# Patient Record
Sex: MALE | Race: WHITE | NOT HISPANIC OR LATINO | Employment: FULL TIME | URBAN - METROPOLITAN AREA
[De-identification: names, ages, dates, MRNs, and addresses within clinical notes are randomized per-mention and may not be internally consistent; named-entity substitution may affect disease eponyms.]

---

## 2020-02-11 ENCOUNTER — OFFICE VISIT CONVERTED (OUTPATIENT)
Dept: ORTHOPEDIC SURGERY | Facility: CLINIC | Age: 60
End: 2020-02-11
Attending: ORTHOPAEDIC SURGERY

## 2020-11-17 ENCOUNTER — OFFICE VISIT CONVERTED (OUTPATIENT)
Dept: ORTHOPEDIC SURGERY | Facility: CLINIC | Age: 60
End: 2020-11-17
Attending: ORTHOPAEDIC SURGERY

## 2021-01-15 ENCOUNTER — HOSPITAL ENCOUNTER (OUTPATIENT)
Dept: URGENT CARE | Facility: CLINIC | Age: 61
Discharge: HOME | End: 2021-01-15
Attending: NURSE PRACTITIONER

## 2021-02-03 ENCOUNTER — HOSPITAL ENCOUNTER (OUTPATIENT)
Dept: GENERAL RADIOLOGY | Facility: HOSPITAL | Age: 61
Discharge: HOME | End: 2021-02-03
Attending: INTERNAL MEDICINE

## 2021-02-22 ENCOUNTER — HOSPITAL ENCOUNTER (OUTPATIENT)
Dept: CARDIOLOGY | Facility: HOSPITAL | Age: 61
Discharge: HOME | End: 2021-02-22
Attending: INTERNAL MEDICINE

## 2021-04-01 ENCOUNTER — OFFICE VISIT CONVERTED (OUTPATIENT)
Dept: ORTHOPEDIC SURGERY | Facility: CLINIC | Age: 61
End: 2021-04-01

## 2021-05-13 NOTE — PROGRESS NOTES
Progress Note      Patient Name: Osmani Bucio   Patient ID: [id number]   Sex: Male   YOB: 1960    Primary Care Provider: Wild Doyle MD    Visit Date: November 17, 2020    Provider: Kaur Florence MD   Location: [facility]   Location Address: [address] [place] [address]  [id number]   Location Phone: [phone]          Chief Complaint  · Follow up bilateral knee pain      History Of Present Illness  Osmani Bucio is a 60 year old /White male who presents today to [facility].      Patient presents today with a follow-up of bilateral knee pain. Patient has a history of bilateral knee osteoarthritis. Knee pain is greater on the right than the left. Patient states history of right knee arthroscopy 5+ years ago. Patient states he does have some shooting pain in his knees when he moves them a particular way. The injection he received last visit did give him significant relief of pain.       Past Medical History  Arthritis; Diabetes; Limb Swelling; Reflux         Past Surgical History  Appendectomy; Back; Colonoscopy; Joint Surgery; Metal implants         Medication List  diclofenac sodium 75 mg oral tablet,delayed release (DR/EC)         Allergy List  Codiene       Allergies Reconciled  Family Medical History  Heart Disease; Cancer, Unspecified; Diabetes, unspecified type; Osteoporosis; Family history of Arthritis         Social History  Alcohol Use (Never); lives with spouse; .; Recreational Drug Use (Never); Tobacco (Never); Working         Review of Systems  · Constitutional  o Denies  o : fever, chills, weight loss  · Cardiovascular  o Denies  o : chest pain, shortness of breath  · Gastrointestinal  o Denies  o : liver disease, heartburn, nausea, blood in stools  · Genitourinary  o Denies  o : painful urination, blood in urine  · Integument  o Denies  o : rash, itching  · Neurologic  o Denies  o : headache, weakness, loss of  "consciousness  · Musculoskeletal  o Denies  o : painful, swollen joints  · Psychiatric  o Denies  o : drug/alcohol addiction, anxiety, depression      Vitals  Date Time BP Position Site L\R Cuff Size HR RR TEMP (F) WT  HT  BMI kg/m2 BSA m2 O2 Sat FR L/min FiO2 HC       11/17/2020 02:49 PM      72 - R   247lbs 0oz 6'  1\" 32.59 2.4 98 %            Physical Examination  · Constitutional  o Appearance  o : well developed, well-nourished, no obvious deformities present  · Head and Face  o Head  o :   § Inspection  § : normocephalic  o Face  o :   § Inspection  § : no facial lesions  · Eyes  o Conjunctivae  o : conjunctivae normal  o Sclerae  o : sclerae white  · Ears, Nose, Mouth and Throat  o Ears  o :   § External Ears  § : appearance within normal limits  § Hearing  § : intact  o Nose  o :   § External Nose  § : appearance normal  · Neck  o Inspection/Palpation  o : normal appearance  o Range of Motion  o : full range of motion  · Respiratory  o Respiratory Effort  o : breathing unlabored  o Inspection of Chest  o : normal appearance  o Auscultation of Lungs  o : no audible wheezing or rales  · Cardiovascular  o Heart  o : regular rate  · Gastrointestinal  o Abdominal Examination  o : soft and non-tender  · Skin and Subcutaneous Tissue  o General Inspection  o : intact, no rashes  · Psychiatric  o General  o : Alert and oriented x3  o Judgement and Insight  o : judgment and insight intact  o Mood and Affect  o : mood normal, affect appropriate  · Extremities  o Extremities  o : BILATERAL KNEES: Sensation grossly intact. Neurovascular intact. Full weight-bearing. Near-full range of motion. Pain with movement. Positive crepitus. Joint line tenderness. No swelling, skin discoloration or atrophy. Calf supple, non-tender. Dorsal Pedal Pulse 2+, posterior tibialis pulse 2+.   · Injection Note/Aspiration Note  o Site  o : bilateral knees   o Procedure  o : Procedure: After educating the patient, patient gave consent for " procedure. After using Chloraprep, the joint space was injected. The patient tolerated the procedure well.   o Medication  o : 80 mg of DepoMedrol with 9cc of 1% Lidocaine          Assessment  · Primary osteoarthritis of right knee     715.16/M17.11  · Primary osteoarthritis of left knee     715.16/M17.12  · Pain: Knee     719.46/M25.569      Plan  · Orders  o 2 - Depo-Medrol injection 80mg () - 719.46/M25.569 - 11/17/2020   Lot [id number] Exp 10 2021 [employer] Administered by ARCHIE Florence MD  o 2 - Knee Intra-articular Injection without US Guidance [facility] (25250) - 719.46/M25.569 - 11/17/2020   Lot [id number] Exp 08 01 2021 [employer] Administered by ARCHIE Florence MD  · Medications  o Medications have been Reconciled  o Transition of Care or Provider Policy  · Instructions  o Dr. Florence saw and examined the patient and agrees with plan.   o Reviewed the patient's Past Medical, Social, and Family history as well as the ROS at today's visit, no changes.  o Call or return if worsening symptoms.  o Follow Up PRN.  o This note was transcribed by Becky Keyes. mc  o Discussed diagnosis and treatment options with the patient. Patient opted for an injection and tolerated it well.            Electronically Signed by: Becky Keyes-, Other -Author on November 19, 2020 01:44:58 PM  Electronically Co-signed by: Kaur Florence MD -Reviewer on November 20, 2020 08:25:15 AM

## 2021-05-14 VITALS — HEIGHT: 73 IN | HEART RATE: 72 BPM | OXYGEN SATURATION: 98 % | WEIGHT: 247 LBS | BODY MASS INDEX: 32.74 KG/M2

## 2021-05-14 VITALS — OXYGEN SATURATION: 99 % | HEIGHT: 73 IN | HEART RATE: 74 BPM | WEIGHT: 239.5 LBS | BODY MASS INDEX: 31.74 KG/M2

## 2021-05-14 NOTE — PROGRESS NOTES
Progress Note      Patient Name: Osmani Bucio   Patient ID: [id number]   Sex: Male   YOB: 1960    Primary Care Provider: Wild Doyle MD    Visit Date: April 1, 2021    Provider: Jone Rodriguez PA-C   Location: [facility]   Location Address: [address] [place] [address]  [id number]   Location Phone: [phone]          Chief Complaint  · Bilateral knee pain      History Of Present Illness  Osmani Bucio is a 60 year old /White male who presents today to [facility].      Patient follows up today for bilateral knee pain and osteoarthritis that has persisted for many years.  Patient has had corticosteroid injections to the bilateral knees in the past with good effect.       Past Medical History  Arthritis; Diabetes; Limb Swelling; Reflux         Past Surgical History  Appendectomy; Back; Colonoscopy; Joint Surgery; Metal implants         Medication List  diclofenac sodium 50 mg oral tablet,delayed release (DR/EC); metformin 500 mg oral tablet; omeprazole 20 mg oral capsule,delayed release(DR/EC)         Allergy List  Codiene         Family Medical History  Heart Disease; Cancer, Unspecified; Diabetes, unspecified type; Osteoporosis; Family history of Arthritis         Social History  Alcohol Use (Never); lives with spouse; .; Recreational Drug Use (Never); Tobacco (Never); Working         Review of Systems  · Constitutional  o Denies  o : fever, chills, weight loss  · Cardiovascular  o Denies  o : chest pain, shortness of breath  · Gastrointestinal  o Denies  o : liver disease, heartburn, nausea, blood in stools  · Genitourinary  o Denies  o : painful urination, blood in urine  · Integument  o Denies  o : rash, itching  · Neurologic  o Denies  o : headache, weakness, loss of consciousness  · Musculoskeletal  o Denies  o : painful, swollen joints  · Psychiatric  o Denies  o : drug/alcohol addiction, anxiety, depression      Vitals  Date Time BP  "Position Site L\R Cuff Size HR RR TEMP (F) WT  HT  BMI kg/m2 BSA m2 O2 Sat FR L/min FiO2 HC       04/01/2021 08:18 AM      74 - R   239lbs 8oz 6'  1\" 31.6 2.37 99 %            Physical Examination  · Constitutional  o Appearance  o : well developed, well-nourished, no obvious deformities present  · Head and Face  o Head  o :   § Inspection  § : normocephalic  o Face  o :   § Inspection  § : no facial lesions  · Eyes  o Conjunctivae  o : conjunctivae normal  o Sclerae  o : sclerae white  · Ears, Nose, Mouth and Throat  o Ears  o :   § External Ears  § : appearance within normal limits  § Hearing  § : intact  o Nose  o :   § External Nose  § : appearance normal  · Neck  o Inspection/Palpation  o : normal appearance  o Range of Motion  o : full range of motion  · Respiratory  o Respiratory Effort  o : breathing unlabored  o Inspection of Chest  o : normal appearance  o Auscultation of Lungs  o : no audible wheezing or rales  · Cardiovascular  o Heart  o : regular rate  · Gastrointestinal  o Abdominal Examination  o : soft and non-tender  · Skin and Subcutaneous Tissue  o General Inspection  o : intact, no rashes  · Psychiatric  o General  o : Alert and oriented x3  o Judgement and Insight  o : judgment and insight intact  o Mood and Affect  o : mood normal, affect appropriate  · Right Knee  o Inspection  o : Appearance is normal anatomic and atraumatic. Tenderness to palpation about the medial lateral joint lines. Range of motion is approximately 0 degrees of extension to 120 degrees of flexion. Mildly antalgic but stable gait.  · Left Knee  o Inspection  o : Appearance is normal anatomic and atraumatic. Tenderness to palpation about the medial lateral joint lines. Range of motion is approximately 0 degrees of extension to 120 degrees of flexion. Mildly antalgic but stable gait.  · Injection Note/Aspiration Note  o Site  o : bilateral knees   o Procedure  o : Procedure: After educating the patient, patient gave consent " for procedure. After using Chloraprep, the joint space was injected. The patient tolerated the procedure well.   o Medication  o : 80 mg of DepoMedrol with 9cc of 1% Lidocaine for left knee and 80 mg of DepoMedrol with 9cc of 1% Lidocaine for right knee          Assessment  · Primary osteoarthritis of right knee     715.16/M17.11  · Primary osteoarthritis of left knee     715.16/M17.12  · Pain in both knees, unspecified chronicity       Pain in right knee     719.46/M25.561  Pain in left knee     719.46/M25.562      Plan  · Orders  o 2 - Depo-Medrol injection 80mg () - - 04/01/2021   Lot [id number] Exp 02 2022 [employer] Administered by Davie AGUIAR  o 2 - Knee Intra-articular Injection without US Guidance [facility] (40755) - - 04/01/2021   Lot [id number] 30 01 2022 [employer] Administered by Davie AGUIAR  · Medications  o Medications have been Reconciled  o Transition of Care or Provider Policy  · Instructions  o Reviewed the patient's Past Medical, Social, and Family history as well as the ROS at today's visit, no changes.  o Call or return if worsening symptoms.  o Patient is used diclofenac with good effect in the past which is refilled today. Patient is invited to follow-up on an as-needed basis.  o Portions of this note were generated with voice recognition software. While efforts have been made to proofread the text, some sound alike errors may still persist.             Electronically Signed by: Jone Rodriguez PA-C -Author on April 2, 2021 07:31:31 AM

## 2021-05-15 VITALS — WEIGHT: 249 LBS | OXYGEN SATURATION: 98 % | HEIGHT: 73 IN | BODY MASS INDEX: 33 KG/M2 | HEART RATE: 77 BPM

## 2021-07-27 ENCOUNTER — OFFICE VISIT (OUTPATIENT)
Dept: ORTHOPEDIC SURGERY | Facility: CLINIC | Age: 61
End: 2021-07-27

## 2021-07-27 VITALS — BODY MASS INDEX: 33.4 KG/M2 | WEIGHT: 252 LBS | HEIGHT: 73 IN | OXYGEN SATURATION: 97 % | HEART RATE: 70 BPM

## 2021-07-27 DIAGNOSIS — M25.511 RIGHT SHOULDER PAIN, UNSPECIFIED CHRONICITY: ICD-10-CM

## 2021-07-27 DIAGNOSIS — S46.811A STRAIN OF RIGHT TRAPEZIUS MUSCLE, INITIAL ENCOUNTER: Primary | ICD-10-CM

## 2021-07-27 PROCEDURE — 99213 OFFICE O/P EST LOW 20 MIN: CPT | Performed by: ORTHOPAEDIC SURGERY

## 2021-07-27 RX ORDER — OMEPRAZOLE 20 MG/1
CAPSULE, DELAYED RELEASE ORAL
COMMUNITY
End: 2022-06-27

## 2021-07-27 RX ORDER — CYCLOBENZAPRINE HCL 10 MG
10 TABLET ORAL NIGHTLY PRN
Qty: 14 TABLET | Refills: 0 | Status: SHIPPED | OUTPATIENT
Start: 2021-07-27 | End: 2022-07-06

## 2021-07-27 RX ORDER — GLIMEPIRIDE 4 MG/1
TABLET ORAL
COMMUNITY
Start: 2021-04-23 | End: 2022-09-07

## 2021-07-27 NOTE — PROGRESS NOTES
"Chief Complaint  Initial Evaluation and Pain of the Right Shoulder     Subjective      Osmani Bucio presents to [facility] for an evaluation of right shoulder. Patient states he has been having right shoulder pain that has been ongoing for 2 weeks. He states the pain starts at the base of his neck that radiates down to his shoulder and down to his upper arm. He denies any injury or trauma to his right shoulder.     Allergies   Allergen Reactions   • Codeine Other (See Comments)     Fast heart rate        Social History     Socioeconomic History   • Marital status:      Spouse name: Not on file   • Number of children: Not on file   • Years of education: Not on file   • Highest education level: Not on file   Tobacco Use   • Smoking status: Never Smoker   • Smokeless tobacco: Never Used   Substance and Sexual Activity   • Alcohol use: Never   • Drug use: Never        Review of Systems     Objective   Vital Signs:   Pulse 70   Ht 185.4 cm (73\")   Wt 114 kg (252 lb)   SpO2 97%   BMI 33.25 kg/m²       Physical Exam  Constitutional:       Appearance: Normal appearance. He is well-developed and normal weight.   HENT:      Head: Normocephalic.      Right Ear: Hearing and external ear normal.      Left Ear: Hearing and external ear normal.      Nose: Nose normal.   Eyes:      Conjunctiva/sclera: Conjunctivae normal.   Cardiovascular:      Rate and Rhythm: Normal rate.   Pulmonary:      Effort: Pulmonary effort is normal.      Breath sounds: No wheezing or rales.   Abdominal:      Palpations: Abdomen is soft.      Tenderness: There is no abdominal tenderness.   Musculoskeletal:      Cervical back: Normal range of motion.   Skin:     Findings: No rash.   Neurological:      Mental Status: He is alert and oriented to person, place, and time.   Psychiatric:         Mood and Affect: Mood and affect normal.         Judgment: Judgment normal.       Ortho Exam      RIGHT SHOULDER: Good " tone of deltoid, triceps, wrist extensors and wrist flexors. Sensation grossly intact. Neurovascular intact. Full cervical range of motion. Full elbow range of motion. Non-tender elbow. Skin intact. Radial pulse 2+, ulnar pulse 2+. Abduction to 95 degrees. Near full forward elevation. IR to t5. Tenderness about the base of the neck. Tenderness about the shoulder. ER to 45 degrees.     Procedures      Imaging Results (Most Recent)     Procedure Component Value Units Date/Time    XR Scapula Right [[id number]] Resulted: 07/27/21 1422     Updated: 07/27/21 1422    Narrative:      X-Ray Report:  Right shoulder(s) X-Ray  Indication: Evaluation of right shoulder   AP and Lateral view(s)  Findings: No fracture or dislocation.  Prior studies available for comparison: no            Result Review :       X-Ray Report:  Right shoulder(s) X-Ray  Indication: Evaluation of right shoulder   AP and Lateral view(s)  Findings: No fracture or dislocation.  Prior studies available for comparison: no            Assessment and Plan     DX: Trapezius muscle strain  Right shoulder pain    Discussed treatment plans and diagnosis with the patient. Patient was provided with a prescription for PT. He was also provided with some at home exercises.     Call or return if worsening symptoms.    Follow Up     PRN.       Patient was given instructions and counseling regarding his condition or for health maintenance advice. Please see specific information pulled into the AVS if appropriate.     Scribed for Kaur Florence MD by Becky Keyes.  07/27/21   10:04 EDT

## 2021-08-30 ENCOUNTER — HOSPITAL ENCOUNTER (OUTPATIENT)
Dept: GENERAL RADIOLOGY | Facility: HOSPITAL | Age: 61
Discharge: HOME | End: 2021-08-30
Admitting: NURSE PRACTITIONER

## 2021-08-30 ENCOUNTER — TRANSCRIBE ORDERS (OUTPATIENT)
Dept: ADMINISTRATIVE | Facility: HOSPITAL | Age: 61
End: 2021-08-30

## 2021-08-30 DIAGNOSIS — R06.00 DYSPNEA, UNSPECIFIED TYPE: Primary | ICD-10-CM

## 2021-08-30 DIAGNOSIS — R06.00 DYSPNEA, UNSPECIFIED TYPE: ICD-10-CM

## 2021-08-30 PROCEDURE — 71046 X-RAY EXAM CHEST 2 VIEWS: CPT

## 2021-09-14 ENCOUNTER — TRANSCRIBE ORDERS (OUTPATIENT)
Dept: ADMINISTRATIVE | Facility: HOSPITAL | Age: 61
End: 2021-09-14

## 2021-09-14 DIAGNOSIS — J44.9 CHRONIC OBSTRUCTIVE PULMONARY DISEASE, UNSPECIFIED COPD TYPE (HCC): ICD-10-CM

## 2021-09-14 DIAGNOSIS — R06.02 SOB (SHORTNESS OF BREATH): Primary | ICD-10-CM

## 2021-09-14 DIAGNOSIS — R05.9 COUGH: ICD-10-CM

## 2022-01-12 ENCOUNTER — APPOINTMENT (OUTPATIENT)
Dept: RESPIRATORY THERAPY | Facility: HOSPITAL | Age: 62
End: 2022-01-12

## 2022-05-03 ENCOUNTER — TELEPHONE (OUTPATIENT)
Dept: ORTHOPEDIC SURGERY | Facility: CLINIC | Age: 62
End: 2022-05-03

## 2022-05-03 NOTE — TELEPHONE ENCOUNTER
Caller: ALLEN CABEZAS     Relationship to patient: SPOUSE    Best call back number: [phone]    Chief complaint: BILATERAL KNEE PAIN    Type of visit: INJECTION    Requested date: SOON     If rescheduling, when is the original appointment: NA

## 2022-05-12 ENCOUNTER — OFFICE VISIT (OUTPATIENT)
Dept: ORTHOPEDIC SURGERY | Facility: CLINIC | Age: 62
End: 2022-05-12

## 2022-05-12 VITALS — HEIGHT: 73 IN | WEIGHT: 230.8 LBS | BODY MASS INDEX: 30.59 KG/M2

## 2022-05-12 DIAGNOSIS — M54.2 NECK PAIN: Primary | ICD-10-CM

## 2022-05-12 PROCEDURE — 99213 OFFICE O/P EST LOW 20 MIN: CPT | Performed by: ORTHOPAEDIC SURGERY

## 2022-05-12 RX ORDER — CYCLOBENZAPRINE HCL 10 MG
10 TABLET ORAL 2 TIMES DAILY PRN
Qty: 10 TABLET | Refills: 0 | Status: SHIPPED | OUTPATIENT
Start: 2022-05-12 | End: 2022-05-21

## 2022-05-12 NOTE — PROGRESS NOTES
"Chief Complaint  Follow-up of the Right Shoulder and Follow-up of the Left Shoulder     Subjective      Osmani Bucio presents to [facility] for a follow-up of bilateral shoulders. Patient states he has been having bilateral shoulder pain recently. Pain starts at the base of the skull and radiates into bilateral shoulders. Sometimes pain radiates into the finger. He noticed weakened  at times. He doesn't notice numbness and tingling in the fingers.     Allergies   Allergen Reactions   • Codeine Other (See Comments)     Fast heart rate        Social History     Socioeconomic History   • Marital status:    Tobacco Use   • Smoking status: Never Smoker   • Smokeless tobacco: Never Used   Substance and Sexual Activity   • Alcohol use: Never   • Drug use: Never        Review of Systems     Objective   Vital Signs:   Ht 185.4 cm (73\")   Wt 105 kg (230 lb 12.8 oz)   BMI 30.45 kg/m²       Physical Exam  Constitutional:       Appearance: Normal appearance. Patient is well-developed and normal weight.   HENT:      Head: Normocephalic.      Right Ear: Hearing and external ear normal.      Left Ear: Hearing and external ear normal.      Nose: Nose normal.   Eyes:      Conjunctiva/sclera: Conjunctivae normal.   Cardiovascular:      Rate and Rhythm: Normal rate.   Pulmonary:      Effort: Pulmonary effort is normal.      Breath sounds: No wheezing or rales.   Abdominal:      Palpations: Abdomen is soft.      Tenderness: There is no abdominal tenderness.   Musculoskeletal:      Cervical back: Normal range of motion.   Skin:     Findings: No rash.   Neurological:      Mental Status: Patient is alert and oriented to person, place, and time.   Psychiatric:         Mood and Affect: Mood and affect normal.         Judgment: Judgment normal.       Ortho Exam      BILATERAL SHOULDERS: Limited neck range of motion. IR to L5. Positive spurlings. Good tone of deltoid, biceps, triceps, wrist " extensors, and wrist flexors.  Sensation grossly intact. Neurovascular intact.  Near full forward elevation with pain in the neck. Radial pulse 2+, ulnar pulse 2+. No swelling, skin discoloration or atrophy.       Procedures      Imaging Results (Most Recent)     None           Result Review :         No results found.           Assessment and Plan     Diagnoses and all orders for this visit:    1. Neck pain (Primary)        Discussed referral to a specialist to have his cervical spine evaluated vs MRI. Mri of the cervical spine ordered. Muscle relaxer prescribed.     Call or return if worsening symptoms.    Follow Up     After MRI.       Patient was given instructions and counseling regarding his condition or for health maintenance advice. Please see specific information pulled into the AVS if appropriate.     Scribed for Kaur Florence MD by Becky Keyes.  05/12/22   10:50 EDT    I have personally performed the services described in this document as scribed by the above individual and it is both accurate and complete. Kaur Florence MD 05/12/22

## 2022-05-23 ENCOUNTER — TELEPHONE (OUTPATIENT)
Dept: ORTHOPEDIC SURGERY | Facility: CLINIC | Age: 62
End: 2022-05-23
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
## 2022-05-24 NOTE — TELEPHONE ENCOUNTER
Per Dr. Florence - Needs to see if PCP will order MRI. Called and spoke with patient he understands and will call his PCP.

## 2022-06-07 ENCOUNTER — HOSPITAL ENCOUNTER (OUTPATIENT)
Dept: MRI IMAGING | Facility: HOSPITAL | Age: 62
Discharge: HOME | End: 2022-06-07
Admitting: ORTHOPAEDIC SURGERY

## 2022-06-07 DIAGNOSIS — M54.2 NECK PAIN: ICD-10-CM

## 2022-06-07 PROCEDURE — 72141 MRI NECK SPINE W/O DYE: CPT

## 2022-06-14 ENCOUNTER — OFFICE VISIT (OUTPATIENT)
Dept: ORTHOPEDIC SURGERY | Facility: CLINIC | Age: 62
End: 2022-06-14

## 2022-06-14 VITALS — HEIGHT: 73 IN | OXYGEN SATURATION: 97 % | WEIGHT: 230 LBS | BODY MASS INDEX: 30.48 KG/M2 | HEART RATE: 74 BPM

## 2022-06-14 DIAGNOSIS — M54.2 NECK PAIN: Primary | ICD-10-CM

## 2022-06-14 PROCEDURE — 99213 OFFICE O/P EST LOW 20 MIN: CPT | Performed by: ORTHOPAEDIC SURGERY
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
## 2022-06-27 ENCOUNTER — LAB (OUTPATIENT)
Dept: LAB | Facility: HOSPITAL | Age: 62
End: 2022-06-27

## 2022-06-27 ENCOUNTER — OFFICE VISIT (OUTPATIENT)
Dept: FAMILY MEDICINE CLINIC | Facility: CLINIC | Age: 62
End: 2022-06-27

## 2022-06-27 VITALS
DIASTOLIC BLOOD PRESSURE: 86 MMHG | BODY MASS INDEX: 30.87 KG/M2 | SYSTOLIC BLOOD PRESSURE: 122 MMHG | WEIGHT: 232.9 LBS | HEIGHT: 73 IN | TEMPERATURE: 98 F | OXYGEN SATURATION: 97 % | RESPIRATION RATE: 18 BRPM | HEART RATE: 70 BPM

## 2022-06-27 DIAGNOSIS — M25.50 ARTHRALGIA, UNSPECIFIED JOINT: ICD-10-CM

## 2022-06-27 DIAGNOSIS — M54.42 CHRONIC BILATERAL LOW BACK PAIN WITH BILATERAL SCIATICA: ICD-10-CM

## 2022-06-27 DIAGNOSIS — Z11.59 ENCOUNTER FOR HEPATITIS C SCREENING TEST FOR LOW RISK PATIENT: ICD-10-CM

## 2022-06-27 DIAGNOSIS — Z13.220 SCREENING FOR LIPID DISORDERS: ICD-10-CM

## 2022-06-27 DIAGNOSIS — E11.65 TYPE 2 DIABETES MELLITUS WITH HYPERGLYCEMIA, WITHOUT LONG-TERM CURRENT USE OF INSULIN: ICD-10-CM

## 2022-06-27 DIAGNOSIS — M19.90 ARTHRITIS: ICD-10-CM

## 2022-06-27 DIAGNOSIS — K21.9 GASTROESOPHAGEAL REFLUX DISEASE WITHOUT ESOPHAGITIS: ICD-10-CM

## 2022-06-27 DIAGNOSIS — G89.29 CHRONIC BILATERAL LOW BACK PAIN WITH BILATERAL SCIATICA: ICD-10-CM

## 2022-06-27 DIAGNOSIS — B37.9 YEAST INFECTION: Primary | ICD-10-CM

## 2022-06-27 DIAGNOSIS — Z12.5 PROSTATE CANCER SCREENING: ICD-10-CM

## 2022-06-27 DIAGNOSIS — M54.41 CHRONIC BILATERAL LOW BACK PAIN WITH BILATERAL SCIATICA: ICD-10-CM

## 2022-06-27 DIAGNOSIS — R39.15 URINARY URGENCY: ICD-10-CM

## 2022-06-27 DIAGNOSIS — M54.2 NECK PAIN: ICD-10-CM

## 2022-06-27 DIAGNOSIS — R30.0 DYSURIA: ICD-10-CM

## 2022-06-27 LAB
ALBUMIN SERPL-MCNC: 4.4 G/DL (ref 3.5–5.2)
ALBUMIN/GLOB SERPL: 1.6 G/DL
ALP SERPL-CCNC: 60 U/L (ref 39–117)
ALT SERPL W P-5'-P-CCNC: 22 U/L (ref 1–41)
ANION GAP SERPL CALCULATED.3IONS-SCNC: 10.5 MMOL/L (ref 5–15)
AST SERPL-CCNC: 17 U/L (ref 1–40)
BASOPHILS # BLD AUTO: 0.09 10*3/MM3 (ref 0–0.2)
BASOPHILS NFR BLD AUTO: 1.3 % (ref 0–1.5)
BILIRUB SERPL-MCNC: 0.5 MG/DL (ref 0–1.2)
BUN SERPL-MCNC: 16 MG/DL (ref 8–23)
BUN/CREAT SERPL: 16 (ref 7–25)
CALCIUM SPEC-SCNC: 9.9 MG/DL (ref 8.6–10.5)
CHLORIDE SERPL-SCNC: 102 MMOL/L (ref 98–107)
CHOLEST SERPL-MCNC: 189 MG/DL (ref 0–200)
CHROMATIN AB SERPL-ACNC: <10 IU/ML (ref 0–14)
CK SERPL-CCNC: 159 U/L (ref 20–200)
CO2 SERPL-SCNC: 25.5 MMOL/L (ref 22–29)
CREAT SERPL-MCNC: 1 MG/DL (ref 0.76–1.27)
CRP SERPL-MCNC: 0.47 MG/DL (ref 0–0.5)
DEPRECATED RDW RBC AUTO: 41.3 FL (ref 37–54)
EGFRCR SERPLBLD CKD-EPI 2021: 86.2 ML/MIN/1.73
EOSINOPHIL # BLD AUTO: 0.39 10*3/MM3 (ref 0–0.4)
EOSINOPHIL NFR BLD AUTO: 5.7 % (ref 0.3–6.2)
ERYTHROCYTE [DISTWIDTH] IN BLOOD BY AUTOMATED COUNT: 12.7 % (ref 12.3–15.4)
ERYTHROCYTE [SEDIMENTATION RATE] IN BLOOD: 6 MM/HR (ref 0–20)
GLOBULIN UR ELPH-MCNC: 2.7 GM/DL
GLUCOSE SERPL-MCNC: 323 MG/DL (ref 65–99)
HBA1C MFR BLD: 12.7 % (ref 4.8–5.6)
HCT VFR BLD AUTO: 47.3 % (ref 37.5–51)
HCV AB SER DONR QL: NORMAL
HDLC SERPL-MCNC: 24 MG/DL (ref 40–60)
HGB BLD-MCNC: 15.5 G/DL (ref 13–17.7)
IMM GRANULOCYTES # BLD AUTO: 0.05 10*3/MM3 (ref 0–0.05)
IMM GRANULOCYTES NFR BLD AUTO: 0.7 % (ref 0–0.5)
LDLC SERPL CALC-MCNC: 111 MG/DL (ref 0–100)
LDLC/HDLC SERPL: 4.29 {RATIO}
LYMPHOCYTES # BLD AUTO: 2.49 10*3/MM3 (ref 0.7–3.1)
LYMPHOCYTES NFR BLD AUTO: 36.2 % (ref 19.6–45.3)
MCH RBC QN AUTO: 29.2 PG (ref 26.6–33)
MCHC RBC AUTO-ENTMCNC: 32.8 G/DL (ref 31.5–35.7)
MCV RBC AUTO: 89.2 FL (ref 79–97)
MONOCYTES # BLD AUTO: 0.59 10*3/MM3 (ref 0.1–0.9)
MONOCYTES NFR BLD AUTO: 8.6 % (ref 5–12)
NEUTROPHILS NFR BLD AUTO: 3.26 10*3/MM3 (ref 1.7–7)
NEUTROPHILS NFR BLD AUTO: 47.5 % (ref 42.7–76)
NRBC BLD AUTO-RTO: 0 /100 WBC (ref 0–0.2)
PLATELET # BLD AUTO: 232 10*3/MM3 (ref 140–450)
PMV BLD AUTO: 10.4 FL (ref 6–12)
POTASSIUM SERPL-SCNC: 4.3 MMOL/L (ref 3.5–5.2)
PROT SERPL-MCNC: 7.1 G/DL (ref 6–8.5)
PSA SERPL-MCNC: 2.09 NG/ML (ref 0–4)
RBC # BLD AUTO: 5.3 10*6/MM3 (ref 4.14–5.8)
SODIUM SERPL-SCNC: 138 MMOL/L (ref 136–145)
TRIGL SERPL-MCNC: 310 MG/DL (ref 0–150)
TSH SERPL DL<=0.05 MIU/L-ACNC: 1.88 UIU/ML (ref 0.27–4.2)
URATE SERPL-MCNC: 4.3 MG/DL (ref 3.4–7)
VLDLC SERPL-MCNC: 54 MG/DL (ref 5–40)
WBC NRBC COR # BLD: 6.87 10*3/MM3 (ref 3.4–10.8)

## 2022-06-27 PROCEDURE — 87147 CULTURE TYPE IMMUNOLOGIC: CPT

## 2022-06-27 PROCEDURE — 82550 ASSAY OF CK (CPK): CPT

## 2022-06-27 PROCEDURE — 80050 GENERAL HEALTH PANEL: CPT

## 2022-06-27 PROCEDURE — 80061 LIPID PANEL: CPT

## 2022-06-27 PROCEDURE — 86803 HEPATITIS C AB TEST: CPT

## 2022-06-27 PROCEDURE — 86038 ANTINUCLEAR ANTIBODIES: CPT

## 2022-06-27 PROCEDURE — 99204 OFFICE O/P NEW MOD 45 MIN: CPT

## 2022-06-27 PROCEDURE — G0103 PSA SCREENING: HCPCS

## 2022-06-27 PROCEDURE — 84550 ASSAY OF BLOOD/URIC ACID: CPT

## 2022-06-27 PROCEDURE — 86063 ANTISTREPTOLYSIN O SCREEN: CPT

## 2022-06-27 PROCEDURE — 36415 COLL VENOUS BLD VENIPUNCTURE: CPT

## 2022-06-27 PROCEDURE — 87086 URINE CULTURE/COLONY COUNT: CPT

## 2022-06-27 PROCEDURE — 85652 RBC SED RATE AUTOMATED: CPT

## 2022-06-27 PROCEDURE — 86140 C-REACTIVE PROTEIN: CPT

## 2022-06-27 PROCEDURE — 83036 HEMOGLOBIN GLYCOSYLATED A1C: CPT

## 2022-06-27 PROCEDURE — 86431 RHEUMATOID FACTOR QUANT: CPT

## 2022-06-27 PROCEDURE — 86200 CCP ANTIBODY: CPT

## 2022-06-27 RX ORDER — METFORMIN HYDROCHLORIDE EXTENDED-RELEASE TABLETS 1000 MG/1
1000 TABLET, FILM COATED, EXTENDED RELEASE ORAL
Qty: 90 TABLET | Refills: 1 | Status: SHIPPED | OUTPATIENT
Start: 2022-06-27 | End: 2022-06-29

## 2022-06-27 RX ORDER — NYSTATIN 100000 [USP'U]/G
POWDER TOPICAL 2 TIMES DAILY
Qty: 30 G | Refills: 0 | Status: SHIPPED | OUTPATIENT
Start: 2022-06-27 | End: 2022-07-06

## 2022-06-27 RX ORDER — PANTOPRAZOLE SODIUM 40 MG/1
40 TABLET, DELAYED RELEASE ORAL DAILY
COMMUNITY
Start: 2022-06-19

## 2022-06-27 RX ORDER — FLUCONAZOLE 150 MG/1
150 TABLET ORAL
Qty: 2 TABLET | Refills: 0 | Status: SHIPPED | OUTPATIENT
Start: 2022-06-27 | End: 2022-07-06

## 2022-06-27 NOTE — PROGRESS NOTES
Osmani Bucio presents to [facility] with complaints of painful urination, foreskin irritation, arthritis, uncontrolled diabetes, patient is also here to establish as a new patient.      History of Present Illness  This is a 62-year-old male, past medical history significant for diabetes mellitus type 2, GERD, generalized arthritis, neck pain, lower back pain, history of appendectomy, who presents to clinic today with complaints of painful urination, foreskin irritation, arthritis, and uncontrolled diabetes.    Diabetes mellitus type 2: Uncontrolled.  Patient's last A1c was elevated, patient's last blood work done did show blood sugar over 300.  Patient states that he knows his diabetes is not very well controlled, states that he does not know if he takes his medications as he should, but does try to take his metformin twice a day.  He is only taken 500 mg twice a day as he cannot tolerate a higher dose.  Patient also states that he is taking his glimepiride, but states that he does not know if he is taking it as prescribed either.  Patient states he does not take it every day.  Patient states that his diabetes has been the least of his concerns, states that he is more worried about his arthritis, and thus has not paid much of attention to his diabetes.  Patient does admit to a possible yeast infection on his penis, states that he is having painful urination, states that his foreskin does get very irritated and tight as the day goes on.  Patient states that he does have a burning type of sensation, and states that it is likely yeast related.  Patient will not let me do an exam, but states that he will provide urine in a cup.  Patient is also not had eye or foot exam, will discuss this at next visit.    GERD: Currently stable.  Doing well on Protonix.  No acute issues.    Arthritis/neck pain/lower back pain: Patient states that he has been battling with generalized arthritis  for most of his life, states that he is currently been seeing Ortho for this, states that he is seeing him for shoulder pain and for his bilateral knee pain, was told that he is likely going to need bilateral knee replacement in the future.  Has been getting cortisone shots in both of his knees, states he has not had one in over a year, does try to get them as sporadically as possible so that way that they work very well.  Patient states that he has a really hard time moving, states that first thing in the morning he is a little bit more flexible, but states as the day goes on, as he starts to work, he states that the pain does get a lot worse.  Patient also states that he is got chronic lower back pain, did have surgery on this lower back to repair disc about 10 years ago, and does have an upcoming appointment with neurosurgery to evaluate not on the lower back but the neck.  Patient states that he recently had an MRI of the neck, showed pretty severe arthritis, is wondering if this is contributing to his shoulder pain.  Has been through pain management before, is also been through physical therapy, and did not get any relief out of these mechanisms.  Would rather not go through this again, is wondering if surgery just needs to be performed to help alleviate his neck pain.  Patient has never been tested for autoimmune origin, is okay with us obtaining this.    We will repeat blood work, patient up-to-date on colonoscopy.  Refuses shingles, COVID, pneumonia and Tdap vaccines.    Past Medical History:   Diagnosis Date   • Acid reflux    • Arthritis    • Cervical pain (neck)    • Chronic bilateral thoracic back pain    • Diabetes (HCC)    • Limb swelling      Past Surgical History:   • APPENDECTOMY   • BACK SURGERY   • COLONOSCOPY   • JOINT REPLACEMENT    joint surgery    • OTHER SURGICAL HISTORY    metal implants        Social History     Socioeconomic History   • Marital status:    Tobacco Use   • Smoking  "status: Never Smoker   • Smokeless tobacco: Never Used   Vaping Use   • Vaping Use: Never used   Substance and Sexual Activity   • Alcohol use: Never   • Drug use: Never   • Sexual activity: Defer     Socioeconomic History   • Marital status:    Tobacco Use   • Smoking status: Never Smoker   • Smokeless tobacco: Never Used   Vaping Use   • Vaping Use: Never used   Substance and Sexual Activity   • Alcohol use: Never   • Drug use: Never   • Sexual activity: Defer      Problem Relation Age of Onset   • Diabetes Mother    • Osteoporosis Mother    • Arthritis Mother    • Heart disease Father    • Cancer Father    • Arthritis Father    • Heart disease Sister    • Osteoporosis Sister    • Osteoporosis Brother          Objective   Vital Signs:   /86   Pulse 70   Temp 98 °F (36.7 °C)   Resp 18   Ht 185.4 cm (73\")   Wt 106 kg (232 lb 14.4 oz)   SpO2 97%   BMI 30.73 kg/m²     Body mass index is 30.73 kg/m².    All labs, imaging, test results, and specialty provider notes reviewed with patient.       Physical Exam  Vitals reviewed.   Constitutional:       Appearance: Normal appearance.   Cardiovascular:      Rate and Rhythm: Normal rate and regular rhythm.      Pulses: Normal pulses.      Heart sounds: Normal heart sounds.   Pulmonary:      Effort: Pulmonary effort is normal.      Breath sounds: Normal breath sounds.   Neurological:      General: No focal deficit present.      Mental Status: He is alert and oriented to person, place, and time.          Assessment and Plan:  Diagnoses and all orders for this visit:    1. Yeast infection (Primary)  -     nystatin (MYCOSTATIN) 884483 UNIT/GM powder; Apply  topically to the appropriate area as directed 2 (Two) Times a Day.  Dispense: 30 g; Refill: 0  -     fluconazole (Diflucan) 150 MG tablet; Take 1 tablet by mouth Every 72 (Seventy-Two) Hours.  Dispense: 2 tablet; Refill: 0  -     Urine Culture - Urine, Urine, Clean Catch    2. Type 2 diabetes mellitus with " hyperglycemia, without long-term current use of insulin (HCC)  Comments:  We will repeat A1c, change metformin to extended release.  Can adjust medication if indicated.  Orders:  -     CBC Auto Differential; Future  -     Comprehensive Metabolic Panel; Future  -     TSH Rfx On Abnormal To Free T4; Future  -     Hemoglobin A1c; Future  -     metFORMIN (FORTAMET) 1000 MG (OSM) 24 hr tablet; Take 1 tablet by mouth Daily With Breakfast.  Dispense: 90 tablet; Refill: 1    3. Screening for lipid disorders  -     Lipid Panel; Future    4. Dysuria  Comments:  Likely yeast related, large amount of glucose in urinalysis.  We will treat with Diflucan/nystatin.    5. Urinary urgency    6. Arthritis  Comments:  Patient seen Ortho, treatment plan per them.  Continue diclofenac.  Patient has appointment scheduled with neurosurgery.  Orders:  -     Uric acid; Future  -     Antistreptolysin O screen; Future  -     EMILIANO Direct Reflex to 11 Biomarker; Future  -     Cyclic citrul peptide antibody, IgG/IgA; Future  -     CK; Future  -     C-reactive protein; Future  -     Sedimentation rate; Future  -     Rheumatoid factor; Future    7. Encounter for hepatitis C screening test for low risk patient  -     Hepatitis C Antibody; Future    8. Arthralgia, unspecified joint  Comments:  Will obtain labs to rule out autoimmune/gout.  Orders:  -     Uric acid; Future  -     Antistreptolysin O screen; Future  -     EMILIANO Direct Reflex to 11 Biomarker; Future  -     Cyclic citrul peptide antibody, IgG/IgA; Future  -     CK; Future  -     C-reactive protein; Future  -     Sedimentation rate; Future  -     Rheumatoid factor; Future    9. Chronic bilateral low back pain with bilateral sciatica  Comments:  Per neurosurgery, treatment plan per them.    10. Neck pain  Comments:  Patient to see neurosurgery next week, treatment plan per them.    11. Gastroesophageal reflux disease without esophagitis  Comments:  Currently stable.  Continue Protonix.    12.  Prostate cancer screening  -     PSA SCREENING; Future          Follow Up:  No follow-ups on file.    Patient was given instructions and counseling regarding his condition or for health maintenance advice. Please see specific information pulled into the AVS if appropriate.

## 2022-06-28 ENCOUNTER — TELEPHONE (OUTPATIENT)
Dept: FAMILY MEDICINE CLINIC | Facility: CLINIC | Age: 62
End: 2022-06-28

## 2022-06-28 LAB
ANA SER QL: NEGATIVE
ASO AB SERPL-ACNC: NEGATIVE [IU]/ML

## 2022-06-28 NOTE — TELEPHONE ENCOUNTER
Caller: Osmani Bucio    Relationship: Self    Best call back number: [phone]     What was the call regarding: PT CALLED STATING THE PHARMACY COULD NOT FILL HIS METFORMIN. HE WOULD LIKE A CALL BACK TO KNOW WHY.    Do you require a callback: YES

## 2022-06-29 DIAGNOSIS — N30.00 ACUTE CYSTITIS WITHOUT HEMATURIA: Primary | ICD-10-CM

## 2022-06-29 DIAGNOSIS — E11.65 TYPE 2 DIABETES MELLITUS WITH HYPERGLYCEMIA, WITHOUT LONG-TERM CURRENT USE OF INSULIN: Primary | ICD-10-CM

## 2022-06-29 DIAGNOSIS — E78.2 MIXED HYPERLIPIDEMIA: Primary | ICD-10-CM

## 2022-06-29 LAB
BACTERIA SPEC AEROBE CULT: ABNORMAL
CCP IGA+IGG SERPL IA-ACNC: 6 UNITS (ref 0–19)

## 2022-06-29 RX ORDER — AMOXICILLIN 500 MG/1
1000 CAPSULE ORAL 2 TIMES DAILY
Qty: 28 CAPSULE | Refills: 0 | Status: SHIPPED | OUTPATIENT
Start: 2022-06-29 | End: 2022-07-06

## 2022-06-29 RX ORDER — CHLORAL HYDRATE 500 MG
1000 CAPSULE ORAL
Qty: 90 CAPSULE | Refills: 1 | Status: SHIPPED | OUTPATIENT
Start: 2022-06-29 | End: 2022-07-06

## 2022-07-06 ENCOUNTER — OFFICE VISIT (OUTPATIENT)
Dept: NEUROSURGERY | Facility: CLINIC | Age: 62
End: 2022-07-06

## 2022-07-06 VITALS
WEIGHT: 230 LBS | HEIGHT: 73 IN | DIASTOLIC BLOOD PRESSURE: 92 MMHG | BODY MASS INDEX: 30.48 KG/M2 | HEART RATE: 74 BPM | SYSTOLIC BLOOD PRESSURE: 138 MMHG

## 2022-07-06 DIAGNOSIS — Z98.1 HISTORY OF LUMBAR FUSION: ICD-10-CM

## 2022-07-06 DIAGNOSIS — M47.812 CERVICAL SPONDYLOSIS WITHOUT MYELOPATHY: Primary | ICD-10-CM

## 2022-07-06 DIAGNOSIS — M47.27 OSTEOARTHRITIS OF SPINE WITH RADICULOPATHY, LUMBOSACRAL REGION: ICD-10-CM

## 2022-07-06 DIAGNOSIS — M54.12 CERVICAL RADICULOPATHY: ICD-10-CM

## 2022-07-06 PROCEDURE — 99215 OFFICE O/P EST HI 40 MIN: CPT | Performed by: NURSE PRACTITIONER

## 2022-07-06 NOTE — PROGRESS NOTES
"Chief Complaint  Neck Pain    Subjective          Osmani Bucio who is a 62 y.o. year old male who presents to [facility] for evaluation of neck pain.     The patient complains of pain located in the cervical and lumbar spine.  Patients states the pain has been present for several years.  The pain came on gradually.  The pain scale level is 7.  The pain starts in the occiput, down the neck and between the scapula.  Will radiate into the shoulders, and down the right arm into all fingers. The pain is waxing/waning and described as sharp and pressure like.  The pain is worse in the evening and at nighttime. Patient states prolonged standing and prolonged walking makes the pain worse.  Patient states rest, NSAIDs and muscle relaxants makes the pain better.    Associated Symptoms Include: Numbness and Tingling  Conservative Interventions Include: NSAIDs that were somewhat effective. and Muscle Relaxants that were somewhat effective.    Was this the result of an injury or accident?: No    History of Previous Spinal Surgery?: Yes.  Lumbar, Date at least 10 years ago with [facility] in [place] L5/S1 fusion.    He had an MRI Cervical Spine in June, demonstrating multilevel spondylosis most significant from C4/5-C6/7 with                Review of Systems   Musculoskeletal: Positive for neck pain and neck stiffness.   Neurological: Positive for numbness.   All other systems reviewed and are negative.       Objective   Vital Signs:   /92   Pulse 74   Ht 185.4 cm (73\")   Wt 104 kg (230 lb)   BMI 30.34 kg/m²       Physical Exam  Vitals reviewed.   Constitutional:       Appearance: Normal appearance.   Musculoskeletal:      Right shoulder: No tenderness. Normal range of motion.      Left shoulder: No tenderness. Normal range of motion.      Cervical back: Tenderness present. Pain with movement present. Decreased range of motion.      Lumbar back: Tenderness " present. Positive left straight leg raise test. Negative right straight leg raise test.      Right hip: No tenderness. Normal range of motion.      Left hip: No tenderness. Normal range of motion.   Neurological:      Mental Status: He is alert and oriented to person, place, and time.      Gait: Gait is intact.      Deep Tendon Reflexes: Strength normal.      Reflex Scores:       Tricep reflexes are 2+ on the right side and 2+ on the left side.       Bicep reflexes are 2+ on the right side and 2+ on the left side.       Brachioradialis reflexes are 2+ on the right side and 2+ on the left side.       Patellar reflexes are 0 on the right side and 0 on the left side.       Achilles reflexes are 0 on the right side and 0 on the left side.       Neurologic Exam     Mental Status   Oriented to person, place, and time.   Level of consciousness: alert    Motor Exam   Muscle bulk: normal  Overall muscle tone: normal    Strength   Strength 5/5 throughout. Weakness in plantar flexion on the left.     Sensory Exam   Light touch normal.     Gait, Coordination, and Reflexes     Gait  Gait: normal    Reflexes   Right brachioradialis: 2+  Left brachioradialis: 2+  Right biceps: 2+  Left biceps: 2+  Right triceps: 2+  Left triceps: 2+  Right patellar: 0  Left patellar: 0  Right achilles: 0  Left achilles: 0  Right Cali: absent  Left Cali: absent  Right ankle clonus: absent  Left ankle clonus: absent       Result Review :       Data reviewed: Radiologic studies MRI Cervical Spine 6/7/22 at [facility]. Moderate multilevel degenerative changes. Moderate spinal canal stenosis at C5/6 with severe right and moderately severe left neural foraminal stenosis. Mild spinal canal stenosis at C4/5 and C6/7. Severe right bilateral foraminal stenosis at C4/5. No evidence of cord signal change.          Assessment and Plan    Diagnoses and all orders for this visit:    1. Cervical spondylosis without myelopathy (Primary)  -     Ambulatory Referral  to Pain Management    2. Cervical radiculopathy  -     Ambulatory Referral to Pain Management    3. Osteoarthritis of spine with radiculopathy, lumbosacral region  -     MRI Lumbar Spine With & Without Contrast; Future    4. History of lumbar fusion  -     MRI Lumbar Spine With & Without Contrast; Future    Pt presenting for evaluation of neck pain. We reviewed his MRI Cervical Spine, demonstrating multilevel spondylosis. We discussed that surgery would likely not improve his neck pain. Will refer to pain management for cervical epidural steroid injections.     He has multiple pain issues, throughout the axial spine.     He has concerns of worsening low back pain as well bilateral sciatica. He recently went to urgent care for his back. He has history of prior lumbar spine fusion. Exam demonstrating hyporeflexia in the lower extremities with weakness in plantar flexion on the left. Will proceed with MRI Lumbar Spine w/wo to evaluate for surgical vs interventional management.     Follow up in 6 weeks.     I spent 45 minutes caring for Osmani on this date of service. This time includes time spent by me in the following activities:preparing for the visit, reviewing tests, obtaining and/or reviewing a separately obtained history, performing a medically appropriate examination and/or evaluation , counseling and educating the patient/family/caregiver, ordering medications, tests, or procedures, referring and communicating with other health care professionals , documenting information in the medical record and independently interpreting results and communicating that information with the patient/family/caregiver.    Follow Up   Return in about 6 weeks (around 8/17/2022).  Patient was given instructions and counseling regarding his condition or for health maintenance advice.     -Referral to pain management for cervical epidural steroid injections  -MRI Lumbar Spine w/wo  -Follow up in 6 weeks

## 2022-07-06 NOTE — PATIENT INSTRUCTIONS
-Referral to pain management for cervical epidural steroid injections  -MRI Lumbar Spine w/wo  -Follow up in 6 weeks

## 2022-08-02 ENCOUNTER — APPOINTMENT (OUTPATIENT)
Dept: MRI IMAGING | Facility: HOSPITAL | Age: 62
End: 2022-08-02

## 2022-08-05 ENCOUNTER — PREP FOR SURGERY (OUTPATIENT)
Dept: OTHER | Facility: HOSPITAL | Age: 62
End: 2022-08-05

## 2022-08-05 ENCOUNTER — OFFICE VISIT (OUTPATIENT)
Dept: ORTHOPEDIC SURGERY | Facility: CLINIC | Age: 62
End: 2022-08-05

## 2022-08-05 VITALS — HEIGHT: 73 IN | WEIGHT: 230 LBS | BODY MASS INDEX: 30.48 KG/M2

## 2022-08-05 DIAGNOSIS — M17.11 PRIMARY OSTEOARTHRITIS OF RIGHT KNEE: Primary | ICD-10-CM

## 2022-08-05 DIAGNOSIS — M17.11 PRIMARY OSTEOARTHRITIS OF RIGHT KNEE: ICD-10-CM

## 2022-08-05 DIAGNOSIS — M25.561 RIGHT KNEE PAIN, UNSPECIFIED CHRONICITY: Primary | ICD-10-CM

## 2022-08-05 PROBLEM — M17.9 OA (OSTEOARTHRITIS) OF KNEE: Status: ACTIVE | Noted: 2022-08-05

## 2022-08-05 PROCEDURE — 99214 OFFICE O/P EST MOD 30 MIN: CPT | Performed by: ORTHOPAEDIC SURGERY

## 2022-08-05 RX ORDER — TRANEXAMIC ACID 10 MG/ML
1000 INJECTION, SOLUTION INTRAVENOUS ONCE
Status: CANCELLED | OUTPATIENT
Start: 2022-08-05 | End: 2022-08-05

## 2022-08-05 RX ORDER — CEFAZOLIN SODIUM 2 G/100ML
2 INJECTION, SOLUTION INTRAVENOUS ONCE
Status: CANCELLED | OUTPATIENT
Start: 2022-08-05 | End: 2022-08-05

## 2022-08-05 RX ORDER — POVIDONE-IODINE 10 MG/ML
SOLUTION TOPICAL ONCE
Status: CANCELLED | OUTPATIENT
Start: 2022-08-05 | End: 2022-08-05

## 2022-08-05 RX ORDER — CEFAZOLIN SODIUM IN 0.9 % NACL 3 G/100 ML
3 INTRAVENOUS SOLUTION, PIGGYBACK (ML) INTRAVENOUS ONCE
Status: CANCELLED | OUTPATIENT
Start: 2022-08-05 | End: 2022-08-05

## 2022-08-05 NOTE — PROGRESS NOTES
"Chief Complaint  Initial Evaluation of the Right Knee     Subjective      Osmani Bucio presents to [facility] for a evaluation of right knee. He has a history of a right knee scope. He had injections in the past that gave some relief. He denies any new injury or trauma to the right knee. He reports his right knee feels like it has gravel in it. Pain in the knee has progressively worsened over the last several months.     Allergies   Allergen Reactions   • Codeine Other (See Comments)     Fast heart rate  Other reaction(s): heart palpatations, profuse sweating        Social History     Socioeconomic History   • Marital status:    Tobacco Use   • Smoking status: Never Smoker   • Smokeless tobacco: Never Used   Vaping Use   • Vaping Use: Never used   Substance and Sexual Activity   • Alcohol use: Never   • Drug use: Never   • Sexual activity: Defer        Review of Systems     Objective   Vital Signs:   Ht 185.4 cm (73\")   Wt 104 kg (230 lb)   BMI 30.34 kg/m²       Physical Exam  Constitutional:       Appearance: Normal appearance. Patient is well-developed and normal weight.   HENT:      Head: Normocephalic.      Right Ear: Hearing and external ear normal.      Left Ear: Hearing and external ear normal.      Nose: Nose normal.   Eyes:      Conjunctiva/sclera: Conjunctivae normal.   Cardiovascular:      Rate and Rhythm: Normal rate.   Pulmonary:      Effort: Pulmonary effort is normal.      Breath sounds: No wheezing or rales.   Abdominal:      Palpations: Abdomen is soft.      Tenderness: There is no abdominal tenderness.   Musculoskeletal:      Cervical back: Normal range of motion.   Skin:     Findings: No rash.   Neurological:      Mental Status: Patient is alert and oriented to person, place, and time.   Psychiatric:         Mood and Affect: Mood and affect normal.         Judgment: Judgment normal.       Ortho Exam      RIGHT KNEE: flexion to 130 degrees. Full " extension. Guarded gait. Sensation grossly intact. Neurovascular intact.  Good strength to hamstrings, quadriceps, dorsiflexors and plantar flexors. Mild swelling. Tender joint lines. Crepitus with motion. Dorsal Pedal Pulse 2+, posterior tibialis pulse 2+..       Procedures      Imaging Results (Most Recent)     Procedure Component Value Units Date/Time    XR Knee 3 View Right [[id number]] Resulted: 08/05/22 0942     Updated: 08/05/22 0943           Result Review :     X-Ray Report:  Right knee(s) X-Ray  Indication: Evaluation of right knee pain   AP, Lateral and Standing view(s)  Findings: Advancing degenerative changes of the right knee. No acute fractures or dislocation.   Prior studies available for comparison: no     Assessment and Plan     Diagnoses and all orders for this visit:    1. Right knee pain, unspecified chronicity (Primary)  -     XR Knee 3 View Right    2. Primary osteoarthritis of right knee      He is a candidate for a total knee replacement. Knee replacement vs injections discussed. Patient wishes to proceed with a right total knee replacement.     Discussed surgery., Risks/benefits discussed with patient including, but not limited to: infection, bleeding, neurovascular damage, malunion, nonunion, aesthetic deformity, need for further surgery, and death., Discussed with patient the implant type being used during surgery and patient understands and desires to proceed. and Surgery pamphlet given.    Follow Up     Post-operatively.       Patient was given instructions and counseling regarding his condition or for health maintenance advice. Please see specific information pulled into the AVS if appropriate.     Scribed for Kaur Florence MD by Becky Keyes.  08/05/22   09:47 EDT    I have personally performed the services described in this document as scribed by the above individual and it is both accurate and complete. Kaur Florence MD 08/05/22

## 2022-08-16 ENCOUNTER — TELEPHONE (OUTPATIENT)
Dept: FAMILY MEDICINE CLINIC | Facility: CLINIC | Age: 62
End: 2022-08-16

## 2022-08-16 NOTE — TELEPHONE ENCOUNTER
Tried reaching out to patient. Someone answered and said patient was at work. They will be having patient reach out to office.

## 2022-08-22 DIAGNOSIS — Z96.651 AFTERCARE FOLLOWING RIGHT KNEE JOINT REPLACEMENT SURGERY: Primary | ICD-10-CM

## 2022-08-22 DIAGNOSIS — Z47.1 AFTERCARE FOLLOWING RIGHT KNEE JOINT REPLACEMENT SURGERY: Primary | ICD-10-CM

## 2022-08-23 NOTE — TELEPHONE ENCOUNTER
Tried reaching out to patient 3x regarding appointment in September. Spoke with patients wife, she is not listed on verbal. Advised I would send a letter to the home.

## 2022-08-24 ENCOUNTER — TELEPHONE (OUTPATIENT)
Dept: FAMILY MEDICINE CLINIC | Facility: CLINIC | Age: 62
End: 2022-08-24

## 2022-08-24 NOTE — TELEPHONE ENCOUNTER
Spoke with patient. He gives us consent to speak with his wife regarding any information.     Patient is currently working 10 hour shifts at work and his only day off is Friday. He has a knee surgery scheduled for September, he is having to save all his pto for that procedure.     If we can get the patient signed up with [employer] are we able to complete the patients 3 month follow up in September through a [employer] video?

## 2022-09-02 DIAGNOSIS — Z01.818 ENCOUNTER FOR PREADMISSION TESTING: Primary | ICD-10-CM

## 2022-09-07 ENCOUNTER — TELEPHONE (OUTPATIENT)
Dept: URGENT CARE | Facility: CLINIC | Age: 62
End: 2022-09-07

## 2022-09-07 ENCOUNTER — PRE-ADMISSION TESTING (OUTPATIENT)
Dept: PREADMISSION TESTING | Facility: HOSPITAL | Age: 62
End: 2022-09-07

## 2022-09-07 ENCOUNTER — TELEPHONE (OUTPATIENT)
Dept: FAMILY MEDICINE CLINIC | Facility: CLINIC | Age: 62
End: 2022-09-07

## 2022-09-07 ENCOUNTER — APPOINTMENT (OUTPATIENT)
Dept: LAB | Facility: HOSPITAL | Age: 62
End: 2022-09-07

## 2022-09-07 ENCOUNTER — APPOINTMENT (OUTPATIENT)
Dept: PREADMISSION TESTING | Facility: HOSPITAL | Age: 62
End: 2022-09-07

## 2022-09-07 VITALS
OXYGEN SATURATION: 95 % | HEART RATE: 74 BPM | WEIGHT: 227.07 LBS | BODY MASS INDEX: 30.09 KG/M2 | DIASTOLIC BLOOD PRESSURE: 84 MMHG | HEIGHT: 73 IN | SYSTOLIC BLOOD PRESSURE: 128 MMHG | TEMPERATURE: 97.2 F | RESPIRATION RATE: 16 BRPM

## 2022-09-07 DIAGNOSIS — Z01.818 ENCOUNTER FOR PREADMISSION TESTING: ICD-10-CM

## 2022-09-07 DIAGNOSIS — M17.11 PRIMARY OSTEOARTHRITIS OF RIGHT KNEE: ICD-10-CM

## 2022-09-07 LAB
ALBUMIN SERPL-MCNC: 4.5 G/DL (ref 3.5–5.2)
ALBUMIN/GLOB SERPL: 1.5 G/DL
ALP SERPL-CCNC: 65 U/L (ref 39–117)
ALT SERPL W P-5'-P-CCNC: 17 U/L (ref 1–41)
ANION GAP SERPL CALCULATED.3IONS-SCNC: 10.6 MMOL/L (ref 5–15)
AST SERPL-CCNC: 11 U/L (ref 1–40)
BASOPHILS # BLD AUTO: 0.08 10*3/MM3 (ref 0–0.2)
BASOPHILS NFR BLD AUTO: 0.9 % (ref 0–1.5)
BILIRUB SERPL-MCNC: 0.6 MG/DL (ref 0–1.2)
BUN SERPL-MCNC: 17 MG/DL (ref 8–23)
BUN/CREAT SERPL: 15.6 (ref 7–25)
CALCIUM SPEC-SCNC: 9.6 MG/DL (ref 8.6–10.5)
CHLORIDE SERPL-SCNC: 97 MMOL/L (ref 98–107)
CO2 SERPL-SCNC: 28.4 MMOL/L (ref 22–29)
CREAT SERPL-MCNC: 1.09 MG/DL (ref 0.76–1.27)
DEPRECATED RDW RBC AUTO: 41.2 FL (ref 37–54)
EGFRCR SERPLBLD CKD-EPI 2021: 77.7 ML/MIN/1.73
EOSINOPHIL # BLD AUTO: 0.41 10*3/MM3 (ref 0–0.4)
EOSINOPHIL NFR BLD AUTO: 4.6 % (ref 0.3–6.2)
ERYTHROCYTE [DISTWIDTH] IN BLOOD BY AUTOMATED COUNT: 13 % (ref 12.3–15.4)
GLOBULIN UR ELPH-MCNC: 3.1 GM/DL
GLUCOSE SERPL-MCNC: 437 MG/DL (ref 65–99)
HBA1C MFR BLD: 12.3 % (ref 4.8–5.6)
HCT VFR BLD AUTO: 46.9 % (ref 37.5–51)
HGB BLD-MCNC: 15.9 G/DL (ref 13–17.7)
IMM GRANULOCYTES # BLD AUTO: 0.05 10*3/MM3 (ref 0–0.05)
IMM GRANULOCYTES NFR BLD AUTO: 0.6 % (ref 0–0.5)
INR PPP: 0.98 (ref 0.86–1.15)
LYMPHOCYTES # BLD AUTO: 2.67 10*3/MM3 (ref 0.7–3.1)
LYMPHOCYTES NFR BLD AUTO: 29.6 % (ref 19.6–45.3)
MCH RBC QN AUTO: 29.7 PG (ref 26.6–33)
MCHC RBC AUTO-ENTMCNC: 33.9 G/DL (ref 31.5–35.7)
MCV RBC AUTO: 87.5 FL (ref 79–97)
MONOCYTES # BLD AUTO: 0.63 10*3/MM3 (ref 0.1–0.9)
MONOCYTES NFR BLD AUTO: 7 % (ref 5–12)
NEUTROPHILS NFR BLD AUTO: 5.17 10*3/MM3 (ref 1.7–7)
NEUTROPHILS NFR BLD AUTO: 57.3 % (ref 42.7–76)
NRBC BLD AUTO-RTO: 0 /100 WBC (ref 0–0.2)
PLATELET # BLD AUTO: 250 10*3/MM3 (ref 140–450)
PMV BLD AUTO: 10 FL (ref 6–12)
POTASSIUM SERPL-SCNC: 4.1 MMOL/L (ref 3.5–5.2)
PROT SERPL-MCNC: 7.6 G/DL (ref 6–8.5)
PROTHROMBIN TIME: 13.1 SECONDS (ref 11.8–14.9)
QT INTERVAL: 373 MS
RBC # BLD AUTO: 5.36 10*6/MM3 (ref 4.14–5.8)
SODIUM SERPL-SCNC: 136 MMOL/L (ref 136–145)
WBC NRBC COR # BLD: 9.01 10*3/MM3 (ref 3.4–10.8)

## 2022-09-07 PROCEDURE — 83036 HEMOGLOBIN GLYCOSYLATED A1C: CPT

## 2022-09-07 PROCEDURE — 85610 PROTHROMBIN TIME: CPT

## 2022-09-07 PROCEDURE — 36415 COLL VENOUS BLD VENIPUNCTURE: CPT

## 2022-09-07 PROCEDURE — 85025 COMPLETE CBC W/AUTO DIFF WBC: CPT

## 2022-09-07 PROCEDURE — U0004 COV-19 TEST NON-CDC HGH THRU: HCPCS | Performed by: NURSE PRACTITIONER

## 2022-09-07 PROCEDURE — 93005 ELECTROCARDIOGRAM TRACING: CPT

## 2022-09-07 PROCEDURE — C9803 HOPD COVID-19 SPEC COLLECT: HCPCS

## 2022-09-07 PROCEDURE — 80053 COMPREHEN METABOLIC PANEL: CPT

## 2022-09-07 ASSESSMENT — KOOS JR
KOOS JR SCORE: 13
KOOS JR SCORE: 54.84

## 2022-09-07 NOTE — TELEPHONE ENCOUNTER
----- Message from MARGARITA Roach sent at 9/7/2022  7:37 PM EDT -----  Please notify patient of negative COVID test result.

## 2022-09-07 NOTE — DISCHARGE INSTRUCTIONS
IMPORTANT INSTRUCTIONS - PRE-ADMISSION TESTING  DO NOT EAT OR CHEW anything after midnight the night before your procedure.    You may have CLEAR liquids up to 3______ hours prior to ARRIVAL time. INCLUDES GATORADE, 20 OZ, SUGAR FREE, NO RED   Take the following medications the morning of your procedure with JUST A SIP OF WATER:  ___PANTOPRAZOLE  9/11/22 METFORMIN TAKE EVENING DOSE BEFORE 6 P.M.____________________________________________________________________________________________________________________________________________________________________________________    DO NOT BRING your medications to the hospital with you, UNLESS something has changed since your PRE-Admission Testing appointment.  Hold all vitamins, supplements, and NSAIDS (Non- steroidal anti-inflammatory meds) for one week prior to surgery (you MAY take Tylenol or Acetaminophen). HOLD IBUPROFEN FROM NOW UNTIL AFTER SURGERY  If you are diabetic, check your blood sugar the morning of your procedure. If it is less than 70 or if you are feeling symptomatic, call the following number for further instructions: [phone] SAME DAY SURGERY WILL CALL Friday 9/9/22_______.  Use your inhalers/nebulizers as usual, the morning of your procedure. BRING YOUR INHALERS with you. NA  Bring your CPAP or BIPAP to hospital, ONLY IF YOU WILL BE SPENDING THE NIGHT. NA  Make sure you have a ride home and have someone who will stay with you the day of your procedure after you go home.  If you have any questions, please call your Pre-Admission Testing Nurse, __________CHUCHO______ at [phone]____________.   Per anesthesia request, do not smoke for 24 hours before your procedure or as instructed by your surgeon.   NA  SHOWER AS DIRECTED AND SCHEDULED ON PAGE 9 OF TOTAL JOINT EDUCATION BOOK. AND RETURN THE DAY OF SURGERY WITH YOUR TOTAL JOINT EDUCATION BOOKLET

## 2022-09-07 NOTE — NURSING NOTE
GLUCOSE LEVEL CALLED FROM LAB, 437. DR. PALMER AND PCPKRISTIAN NP NOTIFIED. AWAITING FURTHER ORDERS.  9/7/22 1600 PER PCP PT TO FOLLOW W/ THEM FOR MANAGEMENT OF DM  9//22 1500 OR CX FOR 9/12/22 PENDING GLUCOSE CONTROL PER DR. PALMER/EMILY HARE

## 2022-09-07 NOTE — TELEPHONE ENCOUNTER
Dinorah called to inform us that the patient was seen in their office today for pre admission testing. The patient had a glucose of over 400.

## 2022-09-07 NOTE — SIGNIFICANT NOTE
PT PLANS TO USE [facility], PT HAS TRANSPORTATION. PT WALKER WILL BE DELIVERED DOS TO [facility] PT STATES HE DOESN'T NEED 3-1

## 2022-09-08 ENCOUNTER — TELEPHONE (OUTPATIENT)
Dept: ORTHOPEDIC SURGERY | Facility: CLINIC | Age: 62
End: 2022-09-08

## 2022-09-08 NOTE — TELEPHONE ENCOUNTER
CALLED AND SPOKE WITH PATIENT ABOUT LAB WORK THAT WAS DONE AT [facility].  EXPLAINED TO PATIENT THAT A1C WAS 12.3 AND HE WOULD NEED TO CALL HIS PCP, KRISTIAN MEYER FOR TREATMENT AND LET OUR OFFICE KNOW WHEN HIS  A1C IS UNDER 10.  PATIENT VOICES UNDERSTANDING.  JUANITA NOTIFIED IN OFFICE.

## 2022-09-23 ENCOUNTER — TELEPHONE (OUTPATIENT)
Dept: ORTHOPEDIC SURGERY | Facility: CLINIC | Age: 62
End: 2022-09-23

## 2022-09-23 NOTE — TELEPHONE ENCOUNTER
Caller: ALLEN     Relationship to patient: WIFE     Best call back number: [phone]    Chief complaint: RIGHT KNEE PAIN     Type of visit: INJECTION     Requested date: NEXT AVAILABLE      If rescheduling, when is the original appointment: N/A

## 2022-09-30 ENCOUNTER — OFFICE VISIT (OUTPATIENT)
Dept: ORTHOPEDIC SURGERY | Facility: CLINIC | Age: 62
End: 2022-09-30

## 2022-09-30 VITALS — HEIGHT: 72 IN | BODY MASS INDEX: 30.75 KG/M2 | OXYGEN SATURATION: 99 % | HEART RATE: 69 BPM | WEIGHT: 227 LBS

## 2022-09-30 DIAGNOSIS — M17.11 PRIMARY OSTEOARTHRITIS OF RIGHT KNEE: Primary | ICD-10-CM

## 2022-09-30 PROCEDURE — 20610 DRAIN/INJ JOINT/BURSA W/O US: CPT | Performed by: ORTHOPAEDIC SURGERY

## 2022-09-30 RX ORDER — CYCLOBENZAPRINE HCL 10 MG
10 TABLET ORAL 2 TIMES DAILY PRN
Qty: 14 TABLET | Refills: 0 | Status: SHIPPED | OUTPATIENT
Start: 2022-09-30 | End: 2023-01-18 | Stop reason: SDUPTHER

## 2022-09-30 RX ORDER — LIDOCAINE HYDROCHLORIDE 10 MG/ML
5 INJECTION, SOLUTION INFILTRATION; PERINEURAL
Status: COMPLETED | OUTPATIENT
Start: 2022-09-30 | End: 2022-09-30

## 2022-09-30 RX ORDER — TRIAMCINOLONE ACETONIDE 40 MG/ML
40 INJECTION, SUSPENSION INTRA-ARTICULAR; INTRAMUSCULAR
Status: COMPLETED | OUTPATIENT
Start: 2022-09-30 | End: 2022-09-30

## 2022-09-30 RX ADMIN — TRIAMCINOLONE ACETONIDE 40 MG: 40 INJECTION, SUSPENSION INTRA-ARTICULAR; INTRAMUSCULAR at 09:47

## 2022-09-30 RX ADMIN — LIDOCAINE HYDROCHLORIDE 5 ML: 10 INJECTION, SOLUTION INFILTRATION; PERINEURAL at 09:47

## 2022-09-30 NOTE — PROGRESS NOTES
"Chief Complaint  Pain and Follow-up of the Right Knee     Subjective      Osmani Bucio presents to [facility] for a follow-up of right knee. He has a history of a right knee scope. He had injections in the past that gave some relief. He denies any new injury or trauma to the right knee. He has osteoarthritis of the right knee. Patient was scheduled for a right total knee replacement however his A1c was too high.     Allergies   Allergen Reactions   • Codeine Other (See Comments)     Fast heart rate  Other reaction(s): heart palpatations, profuse sweating   • Morphine Nausea And Vomiting        Social History     Socioeconomic History   • Marital status:    Tobacco Use   • Smoking status: Never Smoker   • Smokeless tobacco: Never Used   Vaping Use   • Vaping Use: Never used   Substance and Sexual Activity   • Alcohol use: Never   • Drug use: Never   • Sexual activity: Defer        Review of Systems     Objective   Vital Signs:   Pulse 69   Ht 182.9 cm (72\")   Wt 103 kg (227 lb)   SpO2 99%   BMI 30.79 kg/m²       Physical Exam  Constitutional:       Appearance: Normal appearance. Patient is well-developed and normal weight.   HENT:      Head: Normocephalic.      Right Ear: Hearing and external ear normal.      Left Ear: Hearing and external ear normal.      Nose: Nose normal.   Eyes:      Conjunctiva/sclera: Conjunctivae normal.   Cardiovascular:      Rate and Rhythm: Normal rate.   Pulmonary:      Effort: Pulmonary effort is normal.      Breath sounds: No wheezing or rales.   Abdominal:      Palpations: Abdomen is soft.      Tenderness: There is no abdominal tenderness.   Musculoskeletal:      Cervical back: Normal range of motion.   Skin:     Findings: No rash.   Neurological:      Mental Status: Patient is alert and oriented to person, place, and time.   Psychiatric:         Mood and Affect: Mood and affect normal.         Judgment: Judgment normal.       Ortho Exam  "     RIGHT KNEE: Flexion to 130 degrees. Good strength to hamstrings, quadriceps, dorsiflexors and plantar flexors. Full extension. Dorsal Pedal Pulse 2+, posterior tibialis pulse 2+. Calf soft. No swelling, skin discoloration or atrophy. Tender joint lines. Crepitus with motion. Stable to varus/valgus stress. Stable anterior and posterior drawer.       Large Joint Arthrocentesis: R knee  Date/Time: 9/30/2022 9:47 AM  Consent given by: patient  Site marked: site marked  Timeout: Immediately prior to procedure a time out was called to verify the correct patient, procedure, equipment, support staff and site/side marked as required   Supporting Documentation  Indications: pain   Procedure Details  Location: knee - R knee  Preparation: Patient was prepped and draped in the usual sterile fashion  Needle gauge: 21g.  Medications administered: 40 mg triamcinolone acetonide 40 MG/ML; 5 mL lidocaine 1 %  Patient tolerance: patient tolerated the procedure well with no immediate complications            Imaging Results (Most Recent)     None           Result Review :         No results found.           Assessment and Plan     Diagnoses and all orders for this visit:    1. Primary osteoarthritis of right knee (Primary)        Risks, benefits and post-operative care of the right total knee discussed. He wishes to proceed.   Patient educated on getting his A1c under control. He understands.   Patient advised to see a endocrinologist.   Right knee steroid injection given, patient tolerated this well.     Discussed surgery., Risks/benefits discussed with patient including, but not limited to: infection, bleeding, neurovascular damage, malunion, nonunion, aesthetic deformity, need for further surgery, and death., Discussed with patient the implant type being used during surgery and patient understands and desires to proceed. and Surgery pamphlet given.    Follow Up     Post-operatively.       Patient was given instructions and  counseling regarding his condition or for health maintenance advice. Please see specific information pulled into the AVS if appropriate.     Scribed for Kaur Florence MD by Becky Keyes.  09/30/22   09:39 EDT    I have personally performed the services described in this document as scribed by the above individual and it is both accurate and complete. Kaur Florence MD 09/30/22

## 2022-10-13 ENCOUNTER — OFFICE VISIT (OUTPATIENT)
Dept: FAMILY MEDICINE CLINIC | Facility: CLINIC | Age: 62
End: 2022-10-13

## 2022-10-13 VITALS
RESPIRATION RATE: 18 BRPM | SYSTOLIC BLOOD PRESSURE: 124 MMHG | HEART RATE: 90 BPM | DIASTOLIC BLOOD PRESSURE: 86 MMHG | TEMPERATURE: 98 F | OXYGEN SATURATION: 97 % | BODY MASS INDEX: 31.31 KG/M2 | WEIGHT: 231.2 LBS | HEIGHT: 72 IN

## 2022-10-13 DIAGNOSIS — M17.11 PRIMARY OSTEOARTHRITIS OF RIGHT KNEE: ICD-10-CM

## 2022-10-13 DIAGNOSIS — Z13.220 SCREENING FOR LIPID DISORDERS: ICD-10-CM

## 2022-10-13 DIAGNOSIS — K21.9 GASTROESOPHAGEAL REFLUX DISEASE WITHOUT ESOPHAGITIS: ICD-10-CM

## 2022-10-13 DIAGNOSIS — Z79.899 ENCOUNTER FOR MEDICATION MANAGEMENT: ICD-10-CM

## 2022-10-13 DIAGNOSIS — Z87.19 HISTORY OF HIATAL HERNIA: ICD-10-CM

## 2022-10-13 DIAGNOSIS — R10.11 RIGHT UPPER QUADRANT ABDOMINAL PAIN: ICD-10-CM

## 2022-10-13 DIAGNOSIS — E11.65 TYPE 2 DIABETES MELLITUS WITH HYPERGLYCEMIA, WITHOUT LONG-TERM CURRENT USE OF INSULIN: Primary | ICD-10-CM

## 2022-10-13 DIAGNOSIS — G62.9 POLYNEUROPATHY: ICD-10-CM

## 2022-10-13 DIAGNOSIS — M54.2 NECK PAIN: ICD-10-CM

## 2022-10-13 DIAGNOSIS — M19.90 ARTHRITIS: ICD-10-CM

## 2022-10-13 PROCEDURE — 80305 DRUG TEST PRSMV DIR OPT OBS: CPT

## 2022-10-13 PROCEDURE — 99214 OFFICE O/P EST MOD 30 MIN: CPT

## 2022-10-13 RX ORDER — METFORMIN HYDROCHLORIDE EXTENDED-RELEASE TABLETS 1000 MG/1
1000 TABLET, FILM COATED, EXTENDED RELEASE ORAL
Qty: 90 TABLET | Refills: 1 | Status: SHIPPED | OUTPATIENT
Start: 2022-10-13

## 2022-10-13 RX ORDER — GABAPENTIN 300 MG/1
300 CAPSULE ORAL 3 TIMES DAILY
Qty: 90 CAPSULE | Refills: 2 | Status: SHIPPED | OUTPATIENT
Start: 2022-10-13

## 2022-10-13 NOTE — ASSESSMENT & PLAN NOTE
Continue with current treatment plan via Ortho, to get diabetes better controlled so that patient can have right knee replaced.

## 2022-10-13 NOTE — ASSESSMENT & PLAN NOTE
= Discussed with patient extensively about the importance of managing his blood sugar, the damage that can be done when blood sugar is not managed appropriately.  Also discussed with patient extensively the importance of taking medications as prescribed, not discontinuing medications until talking with his provider, and also diet changes and exercise changes that need to change as well to help provide better control of his diabetes.  Also discussed with patient that he does need to monitor his blood sugar at home on a regular basis.  We will start patient back on metformin extended release, see if this will provide him less GI effects.  We will also start patient on Jardiance, did give a months worth of samples for 10 mg, we will go ahead and increase to 25 mg after the first month.  Discussed with patient we will repeat blood work in 3 months.

## 2022-10-13 NOTE — ASSESSMENT & PLAN NOTE
Likely exacerbated by uncontrolled blood sugar, but we will go ahead and treat with gabapentin 3 times daily to help with this.  Also goal of discussing with patient to control diabetes will help with his neuropathy pain as well.  UDS/consent obtained, Nikolai reviewed.

## 2022-10-13 NOTE — PROGRESS NOTES
Osmani ECTOR RhodesFortunato presents to [facility] who presents to clinic for follow-up.      History of Present Illness  This is a 62-year-old male who presents to clinic for follow-up.    Diabetes mellitus type 2: Patient states that he went to go have his right knee replaced via Ortho, states that he got blood work done prior to that visit, was told that he cannot have the surgery due to his diabetes being very uncontrolled.  A1c came back at 12.3%, glucose on that testing was 437.  Patient states that he stopped taking his metformin as prescribed, states that he was unable to tolerate this due to the severe diarrhea that he was experiencing.  Patient states that he cut down to only taken 500 mg of the metformin, quit taking the glimepiride, and also states that he had a large breakfast that morning along with a Mountain Dew that probably did not help his cause.  Patient states since that time, he has drastically made changes to his diet.  Has cut back a lot on his sodas, has reduced his carbohydrates as well.  Is trying to increase exercise as able.  Knows that he needs to get his diabetes better under control.  Does admit to a lot of neuropathy type of pain, states that it is pretty present in his lower legs, states that it does wake him up at night at times as well.  Does have some neuropathy pain from his neck as well, has not tried medications for this in the past but states that it is pretty miserable and is wondering if there is anything that he can have to help.  States that his Ortho provider did provide him with some cyclobenzaprine to help with the muscle spasms, did also give a recent injection into his right knee as well.  Has not had eye exam performed or foot exam performed.  Patient is noncompliant in regards to his diabetes.    Patient also admits to worsening acid reflux, states that the Protonix that he is currently on does not seem to be helping a lot with his acid  "reflux.  Does have a history of hiatal hernia, also states that he is having some right upper abdominal pain.  Does still have his gallbladder.  Also wants to make sure there is nothing there that could be worsening his symptoms as well.    Arthritis: Does state that he is doing his best to try to manage this as he can.  Does take ibuprofen/Tylenol over-the-counter as needed, and recently got an injection into his right knee.    The following portions of the patient's history were personally reviewed and updated as appropriate: allergies, current medications, past medical history, past surgical history, past family history, and past social history.       Objective   Vital Signs:   /86   Pulse 90   Temp 98 °F (36.7 °C)   Resp 18   Ht 182.9 cm (72\")   Wt 105 kg (231 lb 3.2 oz)   SpO2 97%   BMI 31.36 kg/m²     Body mass index is 31.36 kg/m².    All labs, imaging, test results, and specialty provider notes reviewed with patient.     Physical Exam  Vitals reviewed.   Constitutional:       Appearance: Normal appearance.   Cardiovascular:      Rate and Rhythm: Normal rate and regular rhythm.      Pulses: Normal pulses.      Heart sounds: Normal heart sounds.   Pulmonary:      Effort: Pulmonary effort is normal.      Breath sounds: Normal breath sounds.   Neurological:      General: No focal deficit present.      Mental Status: He is alert and oriented to person, place, and time.                Assessment and Plan:  Diagnoses and all orders for this visit:    1. Type 2 diabetes mellitus with hyperglycemia, without long-term current use of insulin (HCC) (Primary)  Assessment & Plan:  = Discussed with patient extensively about the importance of managing his blood sugar, the damage that can be done when blood sugar is not managed appropriately.  Also discussed with patient extensively the importance of taking medications as prescribed, not discontinuing medications until talking with his provider, and also diet " changes and exercise changes that need to change as well to help provide better control of his diabetes.  Also discussed with patient that he does need to monitor his blood sugar at home on a regular basis.  We will start patient back on metformin extended release, see if this will provide him less GI effects.  We will also start patient on Jardiance, did give a months worth of samples for 10 mg, we will go ahead and increase to 25 mg after the first month.  Discussed with patient we will repeat blood work in 3 months.    Orders:  -     metFORMIN (FORTAMET) 1000 MG (OSM) 24 hr tablet; Take 1 tablet by mouth Daily With Breakfast.  Dispense: 90 tablet; Refill: 1  -     empagliflozin (Jardiance) 25 MG tablet tablet; Take 1 tablet by mouth Daily.  Dispense: 90 tablet; Refill: 1  -     CBC Auto Differential; Future  -     Comprehensive Metabolic Panel; Future  -     Hemoglobin A1c; Future  -     TSH Rfx On Abnormal To Free T4; Future  -     Urinalysis With Culture If Indicated -; Future  -     Microalbumin / Creatinine Urine Ratio - Urine, Clean Catch; Future    2. Encounter for medication management  -     POC Urine Drug Screen [facility]    3. Polyneuropathy  Assessment & Plan:  Likely exacerbated by uncontrolled blood sugar, but we will go ahead and treat with gabapentin 3 times daily to help with this.  Also goal of discussing with patient to control diabetes will help with his neuropathy pain as well.  UDS/consent obtained, Nikolai reviewed.    Orders:  -     gabapentin (NEURONTIN) 300 MG capsule; Take 1 capsule by mouth 3 (Three) Times a Day.  Dispense: 90 capsule; Refill: 2    4. Primary osteoarthritis of right knee  Assessment & Plan:  Continue with current treatment plan via Ortho, to get diabetes better controlled so that patient can have right knee replaced.      5. Neck pain    6. Screening for lipid disorders  -     Lipid Panel; Future    7. Arthritis    8. Right upper quadrant abdominal  pain  Comments:  Will obtain abdominal ultrasound to further evaluate the gallbladder, also look at hiatal hernia as well.  Orders:  -     US Abdomen Complete; Future    9. Gastroesophageal reflux disease without esophagitis  -     US Abdomen Complete; Future    10. History of hiatal hernia  -     US Abdomen Complete; Future        Follow Up:  Return in about 3 months (around 1/13/2023).    Patient was given instructions and counseling regarding his condition or for health maintenance advice. Please see specific information pulled into the AVS if appropriate.

## 2022-10-14 ENCOUNTER — TELEPHONE (OUTPATIENT)
Dept: FAMILY MEDICINE CLINIC | Facility: CLINIC | Age: 62
End: 2022-10-14

## 2022-10-14 NOTE — TELEPHONE ENCOUNTER
Caller: ALLEN CABEZAS    Relationship to patient: Emergency Contact    Best call back number:    [phone]     Patient is needing: PATIENT'S WIFE CALLED IN AND SAID PATIENTS' INSURANCE WILL NOT PAY FOR THE FAST RELEASE METFORMIN AND PATIENT WOULD LIKE A CALL BACK WITH NEXT BEST STEPS. HE IS ALSO WANTING TO KNOW IF HE CAN TAKE JARDIANCE WITH THE METFORMIN HE IS CURRENTLY TAKING. PLEASE CALL AND ADVISE.

## 2022-10-18 NOTE — TELEPHONE ENCOUNTER
Called and spoke with patients wife about taking the regular metformin instead of extended release and if he can not tolerate that than we can talk about another injectable medication. Informed wife to give us a call back in a couple of days to inform us. She voiced understanding.

## 2022-11-11 ENCOUNTER — APPOINTMENT (OUTPATIENT)
Dept: ULTRASOUND IMAGING | Facility: HOSPITAL | Age: 62
End: 2022-11-11

## 2022-12-02 ENCOUNTER — HOSPITAL ENCOUNTER (OUTPATIENT)
Dept: ULTRASOUND IMAGING | Facility: HOSPITAL | Age: 62
Discharge: HOME | End: 2022-12-02

## 2022-12-02 DIAGNOSIS — Z87.19 HISTORY OF HIATAL HERNIA: ICD-10-CM

## 2022-12-02 DIAGNOSIS — R10.11 RIGHT UPPER QUADRANT ABDOMINAL PAIN: ICD-10-CM

## 2022-12-02 DIAGNOSIS — K21.9 GASTROESOPHAGEAL REFLUX DISEASE WITHOUT ESOPHAGITIS: ICD-10-CM

## 2022-12-02 PROCEDURE — 76705 ECHO EXAM OF ABDOMEN: CPT

## 2023-01-18 ENCOUNTER — OFFICE VISIT (OUTPATIENT)
Dept: FAMILY MEDICINE CLINIC | Facility: CLINIC | Age: 63
End: 2023-01-18
Payer: COMMERCIAL

## 2023-01-18 VITALS
WEIGHT: 228.7 LBS | BODY MASS INDEX: 30.98 KG/M2 | SYSTOLIC BLOOD PRESSURE: 130 MMHG | DIASTOLIC BLOOD PRESSURE: 90 MMHG | HEIGHT: 72 IN | HEART RATE: 76 BPM | TEMPERATURE: 97.9 F | OXYGEN SATURATION: 96 %

## 2023-01-18 DIAGNOSIS — G62.9 POLYNEUROPATHY: ICD-10-CM

## 2023-01-18 DIAGNOSIS — M19.90 ARTHRITIS: ICD-10-CM

## 2023-01-18 DIAGNOSIS — K21.9 GASTROESOPHAGEAL REFLUX DISEASE WITHOUT ESOPHAGITIS: ICD-10-CM

## 2023-01-18 DIAGNOSIS — E11.65 TYPE 2 DIABETES MELLITUS WITH HYPERGLYCEMIA, WITHOUT LONG-TERM CURRENT USE OF INSULIN: ICD-10-CM

## 2023-01-18 DIAGNOSIS — Z87.19 HISTORY OF HIATAL HERNIA: ICD-10-CM

## 2023-01-18 DIAGNOSIS — M17.11 PRIMARY OSTEOARTHRITIS OF RIGHT KNEE: ICD-10-CM

## 2023-01-18 DIAGNOSIS — Z00.00 ANNUAL PHYSICAL EXAM: Primary | ICD-10-CM

## 2023-01-18 PROCEDURE — 99396 PREV VISIT EST AGE 40-64: CPT

## 2023-01-18 PROCEDURE — 99214 OFFICE O/P EST MOD 30 MIN: CPT

## 2023-01-18 RX ORDER — SUCRALFATE 1 G/1
1 TABLET ORAL 4 TIMES DAILY
Qty: 120 TABLET | Refills: 1 | Status: SHIPPED | OUTPATIENT
Start: 2023-01-18

## 2023-01-18 RX ORDER — MELOXICAM 7.5 MG/1
7.5 TABLET ORAL DAILY
Qty: 90 TABLET | Refills: 1 | Status: SHIPPED | OUTPATIENT
Start: 2023-01-18

## 2023-01-18 RX ORDER — CYCLOBENZAPRINE HCL 10 MG
10 TABLET ORAL 2 TIMES DAILY PRN
Qty: 14 TABLET | Refills: 0 | Status: SHIPPED | OUTPATIENT
Start: 2023-01-18 | End: 2023-03-08

## 2023-01-18 NOTE — ASSESSMENT & PLAN NOTE
We will start on low-dose meloxicam to see if this would provide better relief for the arthritis pain and hopefully help prevent from becoming too severe.  Discussed with him to take this daily, do not take ibuprofen with this.   Denies

## 2023-01-18 NOTE — ASSESSMENT & PLAN NOTE
We will send patient in some Carafate to help with symptoms further, continue on pantoprazole at current dose.  Likely hiatal hernia still causing a lot of issues, can consider referral to GI if symptoms persist.

## 2023-01-18 NOTE — ASSESSMENT & PLAN NOTE
Discussed with patient to go get his blood work done so that I can see how well his diabetes has improved, hopefully pretty well.  We will continue on metformin for now as well as the Jardiance.  Patient states that he does have difficulty in time with tolerating the metformin, states that he does have a lot of GI symptoms still, is wondering if there is another medication that he can try.  Has tried Ozempic in the past, cannot tolerate the injections.  Discussed lifestyle modifications, continue with diet changes and exercise

## 2023-01-18 NOTE — PROGRESS NOTES
Osmani Mojicaff presents to [facility] who presents to clinic for 3-month follow-up.      History of Present Illness  This is a 62-year-old male who presents to the clinic for 3-month follow-up.    Diabetes mellitus type 2: Patient has noticed recently that he is not been as hungry, states that he is doing well on the Jardiance, has not noted any severe side effects, is hoping that it is controlling his blood sugar better although he does not check this on a routine basis.  Patient states that he has not gotten his blood work done yet, we will plan on doing so on Friday.  States that he also has noticed that he is lost some weight over the past 2 years, states that he used to be around 260 pounds, is now currently down to 228.  States that he has tried a lot harder to cut back on some of the foods that he was eating, is no longer drinking all the sodas, has changed some of his drinks to be 0 sugar.  Has noticed that his neuropathy has improved as well, states that the gabapentin is giving him some relief there.    Arthritis: Not controlled.  Patient states that he does still experience a lot of pretty severe arthritis, will have flares ups of it, especially when he is doing a lot of physical exertion or activity.  States that he is tried several things in the past, states that he knows a lot of his pain is coming from his neck as he did have an MRI done of this last year and showed pretty severe disc disease.  Has states that he has been through the pain management system, states that it did not work well for his lower back, does not think it is can work well for his neck.  Has tried ibuprofen and Tylenol over-the-counter which does give him a little bit of relief, but states that it is almost measurable how much pain he is in on a constant basis.  States that gabapentin does help with the neuropathy pain, but does not so much help with the arthritis.    We will go obtain blood  "work that is ordered,    The following portions of the patient's history were personally reviewed and updated as appropriate: allergies, current medications, past medical history, past surgical history, past family history, and past social history.       Objective   Vital Signs:   /90 (BP Location: Left arm, Patient Position: Sitting, Cuff Size: Adult)   Pulse 76   Temp 97.9 °F (36.6 °C) (Temporal)   Ht 182.9 cm (72.01\")   Wt 104 kg (228 lb 11.2 oz)   SpO2 96%   BMI 31.01 kg/m²     Body mass index is 31.01 kg/m².    All labs, imaging, test results, and specialty provider notes reviewed with patient.     Physical Exam  Vitals reviewed.   Constitutional:       Appearance: Normal appearance.   Cardiovascular:      Rate and Rhythm: Normal rate and regular rhythm.      Pulses: Normal pulses.      Heart sounds: Normal heart sounds.   Pulmonary:      Effort: Pulmonary effort is normal.      Breath sounds: Normal breath sounds.   Neurological:      General: No focal deficit present.      Mental Status: He is alert and oriented to person, place, and time.              Assessment and Plan:  Diagnoses and all orders for this visit:    1. Annual physical exam (Primary)    2. Primary osteoarthritis of right knee  Assessment & Plan:  We will start on low-dose meloxicam to see if this would provide better relief for the arthritis pain and hopefully help prevent from becoming too severe.  Discussed with him to take this daily, do not take ibuprofen with this.    Orders:  -     meloxicam (Mobic) 7.5 MG tablet; Take 1 tablet by mouth Daily.  Dispense: 90 tablet; Refill: 1    3. Arthritis  -     cyclobenzaprine (FLEXERIL) 10 MG tablet; Take 1 tablet by mouth 2 (Two) Times a Day As Needed for Muscle Spasms.  Dispense: 14 tablet; Refill: 0  -     meloxicam (Mobic) 7.5 MG tablet; Take 1 tablet by mouth Daily.  Dispense: 90 tablet; Refill: 1    4. Type 2 diabetes mellitus with hyperglycemia, without long-term current use of " insulin (HCC)  Assessment & Plan:  Discussed with patient to go get his blood work done so that I can see how well his diabetes has improved, hopefully pretty well.  We will continue on metformin for now as well as the Jardiance.  Patient states that he does have difficulty in time with tolerating the metformin, states that he does have a lot of GI symptoms still, is wondering if there is another medication that he can try.  Has tried Ozempic in the past, cannot tolerate the injections.  Discussed lifestyle modifications, continue with diet changes and exercise    Orders:  -     Microalbumin / Creatinine Urine Ratio - Urine, Clean Catch; Future    5. Polyneuropathy  Assessment & Plan:  Continue on gabapentin.      6. Gastroesophageal reflux disease without esophagitis  Assessment & Plan:  We will send patient in some Carafate to help with symptoms further, continue on pantoprazole at current dose.  Likely hiatal hernia still causing a lot of issues, can consider referral to GI if symptoms persist.    Orders:  -     sucralfate (Carafate) 1 g tablet; Take 1 tablet by mouth 4 (Four) Times a Day.  Dispense: 120 tablet; Refill: 1    7. History of hiatal hernia        Follow Up:  Return in about 3 months (around 4/18/2023).    Patient was given instructions and counseling regarding his condition or for health maintenance advice. Please see specific information pulled into the AVS if appropriate.

## 2023-01-20 ENCOUNTER — LAB (OUTPATIENT)
Dept: LAB | Facility: HOSPITAL | Age: 63
End: 2023-01-20
Payer: COMMERCIAL

## 2023-01-20 DIAGNOSIS — Z13.220 SCREENING FOR LIPID DISORDERS: ICD-10-CM

## 2023-01-20 DIAGNOSIS — E11.65 TYPE 2 DIABETES MELLITUS WITH HYPERGLYCEMIA, WITHOUT LONG-TERM CURRENT USE OF INSULIN: ICD-10-CM

## 2023-01-20 LAB
ALBUMIN SERPL-MCNC: 4.3 G/DL (ref 3.5–5.2)
ALBUMIN/GLOB SERPL: 1.3 G/DL
ALP SERPL-CCNC: 52 U/L (ref 39–117)
ALT SERPL W P-5'-P-CCNC: 15 U/L (ref 1–41)
ANION GAP SERPL CALCULATED.3IONS-SCNC: 10.1 MMOL/L (ref 5–15)
AST SERPL-CCNC: 13 U/L (ref 1–40)
BASOPHILS # BLD AUTO: 0.07 10*3/MM3 (ref 0–0.2)
BASOPHILS NFR BLD AUTO: 0.9 % (ref 0–1.5)
BILIRUB SERPL-MCNC: 0.4 MG/DL (ref 0–1.2)
BILIRUB UR QL STRIP: NEGATIVE
BUN SERPL-MCNC: 18 MG/DL (ref 8–23)
BUN/CREAT SERPL: 16.4 (ref 7–25)
CALCIUM SPEC-SCNC: 10 MG/DL (ref 8.6–10.5)
CHLORIDE SERPL-SCNC: 106 MMOL/L (ref 98–107)
CHOLEST SERPL-MCNC: 179 MG/DL (ref 0–200)
CLARITY UR: CLEAR
CO2 SERPL-SCNC: 27.9 MMOL/L (ref 22–29)
COLOR UR: YELLOW
CREAT SERPL-MCNC: 1.1 MG/DL (ref 0.76–1.27)
DEPRECATED RDW RBC AUTO: 42.3 FL (ref 37–54)
EGFRCR SERPLBLD CKD-EPI 2021: 76.9 ML/MIN/1.73
EOSINOPHIL # BLD AUTO: 0.33 10*3/MM3 (ref 0–0.4)
EOSINOPHIL NFR BLD AUTO: 4 % (ref 0.3–6.2)
ERYTHROCYTE [DISTWIDTH] IN BLOOD BY AUTOMATED COUNT: 13.2 % (ref 12.3–15.4)
GLOBULIN UR ELPH-MCNC: 3.2 GM/DL
GLUCOSE SERPL-MCNC: 159 MG/DL (ref 65–99)
GLUCOSE UR STRIP-MCNC: ABNORMAL MG/DL
HBA1C MFR BLD: 8.9 % (ref 4.8–5.6)
HCT VFR BLD AUTO: 49.3 % (ref 37.5–51)
HDLC SERPL-MCNC: 28 MG/DL (ref 40–60)
HGB BLD-MCNC: 16.3 G/DL (ref 13–17.7)
HGB UR QL STRIP.AUTO: NEGATIVE
IMM GRANULOCYTES # BLD AUTO: 0.04 10*3/MM3 (ref 0–0.05)
IMM GRANULOCYTES NFR BLD AUTO: 0.5 % (ref 0–0.5)
KETONES UR QL STRIP: NEGATIVE
LDLC SERPL CALC-MCNC: 113 MG/DL (ref 0–100)
LDLC/HDLC SERPL: 3.86 {RATIO}
LEUKOCYTE ESTERASE UR QL STRIP.AUTO: NEGATIVE
LYMPHOCYTES # BLD AUTO: 2.81 10*3/MM3 (ref 0.7–3.1)
LYMPHOCYTES NFR BLD AUTO: 34.1 % (ref 19.6–45.3)
MCH RBC QN AUTO: 29.1 PG (ref 26.6–33)
MCHC RBC AUTO-ENTMCNC: 33.1 G/DL (ref 31.5–35.7)
MCV RBC AUTO: 88 FL (ref 79–97)
MONOCYTES # BLD AUTO: 0.65 10*3/MM3 (ref 0.1–0.9)
MONOCYTES NFR BLD AUTO: 7.9 % (ref 5–12)
NEUTROPHILS NFR BLD AUTO: 4.33 10*3/MM3 (ref 1.7–7)
NEUTROPHILS NFR BLD AUTO: 52.6 % (ref 42.7–76)
NITRITE UR QL STRIP: NEGATIVE
NRBC BLD AUTO-RTO: 0 /100 WBC (ref 0–0.2)
PH UR STRIP.AUTO: 6 [PH] (ref 5–8)
PLATELET # BLD AUTO: 262 10*3/MM3 (ref 140–450)
PMV BLD AUTO: 10.1 FL (ref 6–12)
POTASSIUM SERPL-SCNC: 4.3 MMOL/L (ref 3.5–5.2)
PROT SERPL-MCNC: 7.5 G/DL (ref 6–8.5)
PROT UR QL STRIP: NEGATIVE
RBC # BLD AUTO: 5.6 10*6/MM3 (ref 4.14–5.8)
SODIUM SERPL-SCNC: 144 MMOL/L (ref 136–145)
SP GR UR STRIP: >=1.03 (ref 1–1.03)
TRIGL SERPL-MCNC: 215 MG/DL (ref 0–150)
TSH SERPL DL<=0.05 MIU/L-ACNC: 1.5 UIU/ML (ref 0.27–4.2)
UROBILINOGEN UR QL STRIP: ABNORMAL
VLDLC SERPL-MCNC: 38 MG/DL (ref 5–40)
WBC NRBC COR # BLD: 8.23 10*3/MM3 (ref 3.4–10.8)

## 2023-01-20 PROCEDURE — 80050 GENERAL HEALTH PANEL: CPT

## 2023-01-20 PROCEDURE — 80061 LIPID PANEL: CPT

## 2023-01-20 PROCEDURE — 82570 ASSAY OF URINE CREATININE: CPT

## 2023-01-20 PROCEDURE — 82043 UR ALBUMIN QUANTITATIVE: CPT

## 2023-01-20 PROCEDURE — 81003 URINALYSIS AUTO W/O SCOPE: CPT

## 2023-01-20 PROCEDURE — 36415 COLL VENOUS BLD VENIPUNCTURE: CPT

## 2023-01-20 PROCEDURE — 83036 HEMOGLOBIN GLYCOSYLATED A1C: CPT

## 2023-01-21 LAB
ALBUMIN UR-MCNC: <1.2 MG/DL
CREAT UR-MCNC: 62.3 MG/DL
MICROALBUMIN/CREAT UR: NORMAL MG/G{CREAT}

## 2023-01-23 DIAGNOSIS — E78.2 MIXED HYPERLIPIDEMIA: Primary | ICD-10-CM

## 2023-01-23 RX ORDER — ROSUVASTATIN CALCIUM 5 MG/1
5 TABLET, COATED ORAL DAILY
Qty: 90 TABLET | Refills: 1 | Status: SHIPPED | OUTPATIENT
Start: 2023-01-23

## 2023-02-17 DIAGNOSIS — E11.65 TYPE 2 DIABETES MELLITUS WITH HYPERGLYCEMIA, WITHOUT LONG-TERM CURRENT USE OF INSULIN: ICD-10-CM

## 2023-02-17 RX ORDER — EMPAGLIFLOZIN 25 MG/1
TABLET, FILM COATED ORAL
Qty: 90 TABLET | Refills: 1 | Status: SHIPPED | OUTPATIENT
Start: 2023-02-17

## 2023-03-08 DIAGNOSIS — M19.90 ARTHRITIS: ICD-10-CM

## 2023-03-08 RX ORDER — CYCLOBENZAPRINE HCL 10 MG
TABLET ORAL
Qty: 14 TABLET | Refills: 0 | Status: SHIPPED | OUTPATIENT
Start: 2023-03-08

## 2023-03-09 ENCOUNTER — TELEPHONE (OUTPATIENT)
Dept: ORTHOPEDIC SURGERY | Facility: CLINIC | Age: 63
End: 2023-03-09
Payer: COMMERCIAL

## 2023-03-09 NOTE — TELEPHONE ENCOUNTER
Caller: AVELINO     Relationship to patient: SELF    Best call back number:  [phone]     Type of visit: KNEE INJECTION

## 2023-03-17 ENCOUNTER — OFFICE VISIT (OUTPATIENT)
Dept: ORTHOPEDIC SURGERY | Facility: CLINIC | Age: 63
End: 2023-03-17
Payer: COMMERCIAL

## 2023-03-17 VITALS — WEIGHT: 230 LBS | BODY MASS INDEX: 30.48 KG/M2 | HEIGHT: 73 IN

## 2023-03-17 DIAGNOSIS — M17.11 PRIMARY OSTEOARTHRITIS OF RIGHT KNEE: Primary | ICD-10-CM

## 2023-03-17 DIAGNOSIS — M17.12 OSTEOARTHRITIS OF LEFT KNEE, UNSPECIFIED OSTEOARTHRITIS TYPE: ICD-10-CM

## 2023-03-17 PROCEDURE — 20610 DRAIN/INJ JOINT/BURSA W/O US: CPT | Performed by: ORTHOPAEDIC SURGERY

## 2023-03-17 RX ORDER — METHYLPREDNISOLONE ACETATE 80 MG/ML
80 INJECTION, SUSPENSION INTRA-ARTICULAR; INTRALESIONAL; INTRAMUSCULAR; SOFT TISSUE
Status: COMPLETED | OUTPATIENT
Start: 2023-03-17 | End: 2023-03-17

## 2023-03-17 RX ORDER — LIDOCAINE HYDROCHLORIDE 10 MG/ML
5 INJECTION, SOLUTION EPIDURAL; INFILTRATION; INTRACAUDAL; PERINEURAL
Status: COMPLETED | OUTPATIENT
Start: 2023-03-17 | End: 2023-03-17

## 2023-03-17 RX ADMIN — METHYLPREDNISOLONE ACETATE 80 MG: 80 INJECTION, SUSPENSION INTRA-ARTICULAR; INTRALESIONAL; INTRAMUSCULAR; SOFT TISSUE at 10:19

## 2023-03-17 RX ADMIN — LIDOCAINE HYDROCHLORIDE 5 ML: 10 INJECTION, SOLUTION EPIDURAL; INFILTRATION; INTRACAUDAL; PERINEURAL at 10:18

## 2023-03-17 RX ADMIN — METHYLPREDNISOLONE ACETATE 80 MG: 80 INJECTION, SUSPENSION INTRA-ARTICULAR; INTRALESIONAL; INTRAMUSCULAR; SOFT TISSUE at 10:18

## 2023-03-17 RX ADMIN — LIDOCAINE HYDROCHLORIDE 5 ML: 10 INJECTION, SOLUTION EPIDURAL; INFILTRATION; INTRACAUDAL; PERINEURAL at 10:19

## 2023-03-17 NOTE — PROGRESS NOTES
"Chief Complaint  Follow-up of the Right Knee and Follow-up of the Left Knee     Subjective      Osmani Bucio presents to [facility] for follow up of bilateral knees He has a history of osteo arthritis in bilateral knees. He has been treating his knees conservatively. He has a difficulty with his diabetes.  He has gotten his A1C to 8.5.  He is here today for bilateral knee steroid injections.  He may retire shortly and then he will be off his feet more and hopefully he could just continue with conservative measures vs total knee arthroplasty.     Allergies   Allergen Reactions   • Codeine Other (See Comments)     Fast heart rate  Other reaction(s): heart palpatations, profuse sweating   • Morphine Nausea And Vomiting        Social History     Socioeconomic History   • Marital status:    Tobacco Use   • Smoking status: Never   • Smokeless tobacco: Never   Vaping Use   • Vaping Use: Never used   Substance and Sexual Activity   • Alcohol use: Never   • Drug use: Never   • Sexual activity: Defer        Review of Systems     Objective   Vital Signs:   Ht 185.4 cm (73\")   Wt 104 kg (230 lb)   BMI 30.34 kg/m²       Physical Exam  Constitutional:       Appearance: Normal appearance. Patient is well-developed and normal weight.   HENT:      Head: Normocephalic.      Right Ear: Hearing and external ear normal.      Left Ear: Hearing and external ear normal.      Nose: Nose normal.   Eyes:      Conjunctiva/sclera: Conjunctivae normal.   Cardiovascular:      Rate and Rhythm: Normal rate.   Pulmonary:      Effort: Pulmonary effort is normal.      Breath sounds: No wheezing or rales.   Abdominal:      Palpations: Abdomen is soft.      Tenderness: There is no abdominal tenderness.   Musculoskeletal:      Cervical back: Normal range of motion.   Skin:     Findings: No rash.   Neurological:      Mental Status: Patient is alert and oriented to person, place, and time.   Psychiatric:         " Mood and Affect: Mood and affect normal.         Judgment: Judgment normal.       Ortho Exam      BILATERAL KNEES Flexion 120. Extension 0. Stable to varus/valgus stress. Stable to anterior/posterior drawer. Neurovascularly intact. Negative Marisela. Negative Lachman. Positive EHL, FHL, HS and TA. Sensation intact to light touch all 5 nerves of the foot. Ambulates with Antalgic gait. Patella is well tracking. Calf supple, non-tender. Positive tenderness to the medial joint line. Positive tenderness to the lateral joint line. Positive Crepitus. Good strength to hamstrings, quadriceps, dorsiflexors, and plantar flexors.  Knee Extensor Mechanism intact       Large Joint Arthrocentesis: R knee  Date/Time: 3/17/2023 10:18 AM  Consent given by: patient  Site marked: site marked  Timeout: Immediately prior to procedure a time out was called to verify the correct patient, procedure, equipment, support staff and site/side marked as required   Supporting Documentation  Indications: pain   Procedure Details  Location: knee - R knee  Preparation: Patient was prepped and draped in the usual sterile fashion  Needle gauge: 21g.  Medications administered: 5 mL lidocaine PF 1% 1 %; 80 mg methylPREDNISolone acetate 80 MG/ML  Patient tolerance: patient tolerated the procedure well with no immediate complications    Large Joint Arthrocentesis: L knee  Date/Time: 3/17/2023 10:19 AM  Consent given by: patient  Site marked: site marked  Timeout: Immediately prior to procedure a time out was called to verify the correct patient, procedure, equipment, support staff and site/side marked as required   Supporting Documentation  Indications: pain   Procedure Details  Location: knee - L knee  Preparation: Patient was prepped and draped in the usual sterile fashion  Needle gauge: 21g.  Medications administered: 5 mL lidocaine PF 1% 1 %; 80 mg methylPREDNISolone acetate 80 MG/ML  Patient tolerance: patient tolerated the procedure well with no  immediate complications            Imaging Results (Most Recent)     None           Result Review :           Assessment and Plan     Diagnoses and all orders for this visit:    1. Primary osteoarthritis of right knee (Primary)    2. Osteoarthritis of left knee, unspecified osteoarthritis type        Discussed the treatment plan with the patient. Discussed the risks and benefits of conservative measures.  The patient expressed understanding and wished to proceed with bilateral knee steroid injections.  He tolerated the injections well.     Call or return if worsening symptoms.    Follow Up     PRN      Patient was given instructions and counseling regarding his condition or for health maintenance advice. Please see specific information pulled into the AVS if appropriate.     Scribed for Kaur Florence MD by Cindy Ruffin MA.  03/17/23   09:53 EDT    I have personally performed the services described in this document as scribed by the above individual and it is both accurate and complete. Kaur Florence MD 03/17/23

## 2023-05-11 DIAGNOSIS — M19.90 ARTHRITIS: ICD-10-CM

## 2023-05-11 RX ORDER — CYCLOBENZAPRINE HCL 10 MG
10 TABLET ORAL 2 TIMES DAILY PRN
Qty: 30 TABLET | Refills: 0 | Status: SHIPPED | OUTPATIENT
Start: 2023-05-11

## 2023-05-30 ENCOUNTER — OFFICE VISIT (OUTPATIENT)
Dept: FAMILY MEDICINE CLINIC | Facility: CLINIC | Age: 63
End: 2023-05-30

## 2023-05-30 VITALS
HEART RATE: 67 BPM | TEMPERATURE: 98 F | RESPIRATION RATE: 18 BRPM | BODY MASS INDEX: 30.36 KG/M2 | DIASTOLIC BLOOD PRESSURE: 78 MMHG | HEIGHT: 73 IN | SYSTOLIC BLOOD PRESSURE: 122 MMHG | OXYGEN SATURATION: 96 % | WEIGHT: 229.1 LBS

## 2023-05-30 DIAGNOSIS — E11.65 TYPE 2 DIABETES MELLITUS WITH HYPERGLYCEMIA, WITHOUT LONG-TERM CURRENT USE OF INSULIN: Primary | ICD-10-CM

## 2023-05-30 DIAGNOSIS — G62.9 POLYNEUROPATHY: ICD-10-CM

## 2023-05-30 DIAGNOSIS — M19.90 ARTHRITIS: ICD-10-CM

## 2023-05-30 DIAGNOSIS — B37.9 YEAST INFECTION: ICD-10-CM

## 2023-05-30 LAB
EXPIRATION DATE: ABNORMAL
HBA1C MFR BLD: 10.5 %
Lab: ABNORMAL

## 2023-05-30 RX ORDER — CYCLOBENZAPRINE HCL 10 MG
10 TABLET ORAL 2 TIMES DAILY PRN
Qty: 30 TABLET | Refills: 0 | Status: SHIPPED | OUTPATIENT
Start: 2023-05-30

## 2023-05-30 RX ORDER — GABAPENTIN 300 MG/1
300 CAPSULE ORAL 3 TIMES DAILY
Qty: 90 CAPSULE | Refills: 2 | Status: SHIPPED | OUTPATIENT
Start: 2023-05-30

## 2023-05-30 RX ORDER — METFORMIN HYDROCHLORIDE 500 MG/1
500 TABLET, EXTENDED RELEASE ORAL
Qty: 90 TABLET | Refills: 0 | Status: SHIPPED | OUTPATIENT
Start: 2023-05-30

## 2023-05-30 RX ORDER — FLUCONAZOLE 150 MG/1
150 TABLET ORAL ONCE
Qty: 2 TABLET | Refills: 0 | Status: SHIPPED | OUTPATIENT
Start: 2023-05-30 | End: 2023-05-30

## 2023-05-30 NOTE — PROGRESS NOTES
"Chief Complaint  3 month f/u    Subjective      Osmani Bucio presents to [facility] 3 month f/u of DM, GERD and Arthritis.     DM- no issues.  Doesn't check his blood sugar regularly but hasnt had any feelings of low or high numbers.  He has been off of metformin for 3-4 months due to GI side effects.  He states that he was prescribed the extended release but insurance would not cover it. He now has different insurance. Previous a1c ws 8.9.  He states that he has a yeast infection on his penis, crusty white on the skin. Described as itchy. No fever. Has been there for a couple of months. Kind of moist. He has been using the nystatin powder that he was previously prescribed. He is still taking jardiance and has had this problem before.      GERD- he states that he did not start carafate that was prescribed at his last visit. He was afraid to due to information that came with it. He states he was told he could take it as needed and the instructions said not to miss a dose. He is having issues with heartburn about 3-5 times a week.  States that its \"not severe but I know its there\".  He denies any bloody stools or emesis.     Arthritis- On mobic- takes at night.  He states that he has been having a lot of issues with his knees.  He has been trying to avoid surgery.  He states that he retired on 3/22 and was hoping that not being on concrete all the time would be better. So far he has not noticed any difference in his pain.     History of Present Illness    Current Outpatient Medications   Medication Instructions   • cyclobenzaprine (FLEXERIL) 10 mg, Oral, 2 Times Daily PRN   • empagliflozin (JARDIANCE) 25 mg, Oral, Daily   • fluconazole (DIFLUCAN) 150 mg, Oral, Once, Take one and then another after 3 days   • gabapentin (NEURONTIN) 300 mg, Oral, 3 Times Daily   • meloxicam (MOBIC) 7.5 mg, Oral, Daily   • metFORMIN ER (GLUCOPHAGE-XR) 500 mg, Oral, Daily With Breakfast   • " "pantoprazole (PROTONIX) 40 mg, Oral, Daily   • rosuvastatin (CRESTOR) 5 mg, Oral, Daily   • sucralfate (CARAFATE) 1 g, Oral, 4 Times Daily       The following portions of the patient's history were reviewed and updated as appropriate: allergies, current medications, past family history, past medical history, past social history, past surgical history, and problem list.    Objective   Vital Signs:   /78   Pulse 67   Temp 98 °F (36.7 °C)   Resp 18   Ht 185.4 cm (73\")   Wt 104 kg (229 lb 1.6 oz)   SpO2 96%   BMI 30.23 kg/m²     Wt Readings from Last 3 Encounters:   05/30/23 104 kg (229 lb 1.6 oz)   03/17/23 104 kg (230 lb)   03/16/23 104 kg (230 lb)     BP Readings from Last 3 Encounters:   05/30/23 122/78   03/16/23 129/90   01/18/23 130/90     Physical Exam  Vitals reviewed.   Constitutional:       Appearance: Normal appearance. He is well-developed. He is not ill-appearing.   HENT:      Head: Normocephalic and atraumatic.      Right Ear: External ear normal.      Left Ear: External ear normal.   Eyes:      Conjunctiva/sclera: Conjunctivae normal.      Pupils: Pupils are equal, round, and reactive to light.   Cardiovascular:      Rate and Rhythm: Normal rate and regular rhythm.      Heart sounds: Normal heart sounds. No murmur heard.  Pulmonary:      Effort: Pulmonary effort is normal.      Breath sounds: Normal breath sounds.   Genitourinary:     Comments: deferred  Skin:     General: Skin is warm and dry.   Neurological:      Mental Status: He is alert and oriented to person, place, and time.   Psychiatric:         Mood and Affect: Affect normal.          Result Review :  The following data was reviewed by: MARGARITA Frank on 05/30/2023:      Common labs        9/7/2022    10:06 1/20/2023    10:44 1/20/2023    10:51 5/30/2023    08:42   Common Labs   Glucose 437   159       BUN 17   18       Creatinine 1.09   1.10       Sodium 136   144       Potassium 4.1   4.3       Chloride 97   106     "   Calcium 9.6   10.0       Albumin 4.50   4.3       Total Bilirubin 0.6   0.4       Alkaline Phosphatase 65   52       AST (SGOT) 11   13       ALT (SGPT) 17   15       WBC 9.01   8.23       Hemoglobin 15.9   16.3       Hematocrit 46.9   49.3       Platelets 250   262       Total Cholesterol  179       Triglycerides  215       HDL Cholesterol  28       LDL Cholesterol   113       Hemoglobin A1C 12.30   8.90    10.5     Microalbumin, Urine   <1.2          Lab Results (last 72 hours)     Procedure Component Value Units Date/Time    POC Glycosylated Hemoglobin (Hb A1C) [[id number]]  (Abnormal) Collected: 05/30/23 0842    Specimen: Blood Updated: 05/30/23 0842     Hemoglobin A1C 10.5 %      Lot Number [id number]     Expiration Date 11-7-2024           No Images in the past 120 days found..    Lab Results   Component Value Date    COVID19 Not Detected 09/07/2022    INR 0.98 09/07/2022    BILIRUBINUR Negative 01/20/2023       Procedures        Assessment and Plan   Diagnoses and all orders for this visit:    1. Type 2 diabetes mellitus with hyperglycemia, without long-term current use of insulin (Primary)  Comments:  uncontrolled.  A1c increased from previous since being off of metformin.  We will try extended release metformin at low dose to prevent GI side effects.    Orders:  -     empagliflozin (Jardiance) 25 MG tablet tablet; Take 1 tablet by mouth Daily.  Dispense: 90 tablet; Refill: 1  -     POC Glycosylated Hemoglobin (Hb A1C)  -     metFORMIN ER (GLUCOPHAGE-XR) 500 MG 24 hr tablet; Take 1 tablet by mouth Daily With Breakfast.  Dispense: 90 tablet; Refill: 0    2. Yeast infection  -     fluconazole (Diflucan) 150 MG tablet; Take 1 tablet by mouth 1 (One) Time for 1 dose. Take one and then another after 3 days  Dispense: 2 tablet; Refill: 0    3. Arthritis  -     cyclobenzaprine (FLEXERIL) 10 MG tablet; Take 1 tablet by mouth 2 (Two) Times a Day As Needed for Muscle Spasms.  Dispense: 30 tablet; Refill: 0    4.  Polyneuropathy  -     gabapentin (NEURONTIN) 300 MG capsule; Take 1 capsule by mouth 3 (Three) Times a Day.  Dispense: 90 capsule; Refill: 2          Medications Discontinued During This Encounter   Medication Reason   • gabapentin (NEURONTIN) 300 MG capsule Reorder   • Jardiance 25 MG tablet tablet Reorder   • cyclobenzaprine (FLEXERIL) 10 MG tablet Reorder   • metFORMIN (FORTAMET) 1000 MG (OSM) 24 hr tablet Side effects   • azithromycin (Zithromax Z-Placido) 250 MG tablet *Therapy completed          Follow Up   Return in about 3 months (around 8/30/2023).  Patient was given instructions and counseling regarding his condition or for health maintenance advice. Please see specific information pulled into the AVS if appropriate.       MARGARITA Frank  05/30/23  12:03 EDT

## 2023-06-05 ENCOUNTER — TELEPHONE (OUTPATIENT)
Dept: ORTHOPEDIC SURGERY | Facility: CLINIC | Age: 63
End: 2023-06-05
Payer: COMMERCIAL

## 2023-06-05 NOTE — TELEPHONE ENCOUNTER
Caller: ALLEN CABEZAS    Relationship to patient: Emergency Contact    Best call back number: [phone]    Chief complaint: BILATERAL KNEE     Type of visit: INJECTION     Requested date:ASAP

## 2023-06-06 NOTE — TELEPHONE ENCOUNTER
PATIENT'S SPOUSE RETURNED CALL. UNABLE TO REACH OFFICE. PLEASE ADVISE.    CALL BACK #: [phone]

## 2023-08-08 ENCOUNTER — OFFICE VISIT (OUTPATIENT)
Dept: ORTHOPEDIC SURGERY | Facility: CLINIC | Age: 63
End: 2023-08-08
Payer: COMMERCIAL

## 2023-08-08 VITALS
HEIGHT: 73 IN | SYSTOLIC BLOOD PRESSURE: 136 MMHG | WEIGHT: 229.28 LBS | HEART RATE: 87 BPM | BODY MASS INDEX: 30.39 KG/M2 | OXYGEN SATURATION: 96 % | DIASTOLIC BLOOD PRESSURE: 77 MMHG

## 2023-08-08 DIAGNOSIS — M17.12 OSTEOARTHRITIS OF LEFT KNEE, UNSPECIFIED OSTEOARTHRITIS TYPE: ICD-10-CM

## 2023-08-08 DIAGNOSIS — M17.11 PRIMARY OSTEOARTHRITIS OF RIGHT KNEE: Primary | ICD-10-CM

## 2023-08-08 RX ADMIN — Medication 20 MG: at 14:54

## 2023-08-08 NOTE — PATIENT INSTRUCTIONS
Bilateral knee Euflexxa injection #1 administered in office today.  Follow-up in office in 1 week for next injection. Call with changes or concerns.

## 2023-08-09 RX ORDER — HYALURONATE SODIUM 10 MG/ML
20 SYRINGE (ML) INTRAARTICULAR
Status: COMPLETED | OUTPATIENT
Start: 2023-08-08 | End: 2023-08-08

## 2023-08-11 DIAGNOSIS — E78.2 MIXED HYPERLIPIDEMIA: ICD-10-CM

## 2023-08-11 DIAGNOSIS — M17.11 PRIMARY OSTEOARTHRITIS OF RIGHT KNEE: ICD-10-CM

## 2023-08-11 DIAGNOSIS — M19.90 ARTHRITIS: ICD-10-CM

## 2023-08-11 RX ORDER — MELOXICAM 7.5 MG/1
TABLET ORAL
Qty: 90 TABLET | Refills: 1 | Status: SHIPPED | OUTPATIENT
Start: 2023-08-11

## 2023-08-11 RX ORDER — ROSUVASTATIN CALCIUM 5 MG/1
TABLET, COATED ORAL
Qty: 90 TABLET | Refills: 1 | Status: SHIPPED | OUTPATIENT
Start: 2023-08-11

## 2023-08-15 ENCOUNTER — OFFICE VISIT (OUTPATIENT)
Dept: ORTHOPEDIC SURGERY | Facility: CLINIC | Age: 63
End: 2023-08-15
Payer: COMMERCIAL

## 2023-08-15 VITALS
HEART RATE: 81 BPM | DIASTOLIC BLOOD PRESSURE: 87 MMHG | SYSTOLIC BLOOD PRESSURE: 126 MMHG | HEIGHT: 73 IN | OXYGEN SATURATION: 96 % | BODY MASS INDEX: 30.35 KG/M2 | WEIGHT: 229 LBS

## 2023-08-15 DIAGNOSIS — M17.12 OSTEOARTHRITIS OF LEFT KNEE, UNSPECIFIED OSTEOARTHRITIS TYPE: ICD-10-CM

## 2023-08-15 DIAGNOSIS — M17.11 PRIMARY OSTEOARTHRITIS OF RIGHT KNEE: Primary | ICD-10-CM

## 2023-08-15 RX ORDER — HYALURONATE SODIUM 10 MG/ML
20 SYRINGE (ML) INTRAARTICULAR
Status: COMPLETED | OUTPATIENT
Start: 2023-08-15 | End: 2023-08-15

## 2023-08-15 RX ADMIN — Medication 20 MG: at 14:52

## 2023-08-15 NOTE — PATIENT INSTRUCTIONS
Bilateral knee Euflexxa injection #2 administered in office today.  Follow-up in office in 1 week for next injection. Call with changes or concerns.

## 2023-08-15 NOTE — PROGRESS NOTES
"Chief Complaint  Follow-up of the Right Knee and Follow-up of the Left Knee    Subjective      Osmani Bucio presents to [facility] for follow-up of bilateral knee pain and osteoarthritis.  Patient has managed this conservatively with intermittent injections in the past.  He was last seen in office on 8/8/2023 and received bilateral knee Euflexxa injections #1.  He presents today independently ambulatory without use of assistive device, stating he has not yet experienced any relief following this injection and states \"if anything they hurt a little bit more\".  However, he is requesting to proceed with repeat bilateral knee Euflexxa injections #2 in office today.    Objective   Allergies   Allergen Reactions    Codeine Other (See Comments)     Fast heart rate  Other reaction(s): heart palpatations, profuse sweating    Morphine Nausea And Vomiting       Vital Signs:   /87   Pulse 81   Ht 185.4 cm (73\")   Wt 104 kg (229 lb)   SpO2 96%   BMI 30.21 kg/mý       Physical Exam    Constitutional: Awake, alert. Well nourished appearance.    Integumentary: Warm, dry, intact. No obvious rashes.    HENT: Atraumatic, normocephalic.   Respiratory: Non labored respirations .   Cardiovascular: Intact peripheral pulses.    Psychiatric: Normal mood and affect. A&O X3    Ortho Exam  Bilateral knees: Skin is warm, dry, and intact.  Tenderness to palpation of medial and lateral joint line.  Crepitus with ROM.  Full knee extension flexion 120-125 degrees.  Full plantarflexion dorsiflexion of the ankle.  Sensation intact light touch.  Distal neurovascular intact.  Smooth sit to stand transition.  Patient fully weightbearing with nonantalgic gait.    Imaging Results (Most Recent)       None             Large Joint Arthrocentesis  Date/Time: 8/15/2023 2:52 PM  Consent given by: patient  Site marked: site marked  Timeout: Immediately prior to procedure a time out was called to verify the correct " patient, procedure, equipment, support staff and site/side marked as required   Supporting Documentation  Indications: pain   Procedure Details  Location: -   Location: RIGHT KNEE.Needle gauge: 21G.  Medications administered: 20 mg Sodium Hyaluronate (Viscosup) 20 MG/2ML  Patient tolerance: patient tolerated the procedure well with no immediate complications      Large Joint Arthrocentesis  Date/Time: 8/15/2023 2:52 PM  Consent given by: patient  Site marked: site marked  Timeout: Immediately prior to procedure a time out was called to verify the correct patient, procedure, equipment, support staff and site/side marked as required   Supporting Documentation  Indications: pain   Procedure Details  Location: -   Location: LEFT KNEE.Needle gauge: 21G.  Medications administered: 20 mg Sodium Hyaluronate (Viscosup) 20 MG/2ML  Patient tolerance: patient tolerated the procedure well with no immediate complications            Assessment and Plan   Problem List Items Addressed This Visit          Musculoskeletal and Injuries    OA (osteoarthritis) of knee - Primary       Follow Up   Return in about 1 week (around 8/22/2023).    Tobacco Use: Low Risk     Smoking Tobacco Use: Never    Smokeless Tobacco Use: Never    Passive Exposure: Not on file     Patient is a non-smoker.  Did not discuss tobacco cessation options.    Patient Instructions   Bilateral knee Euflexxa injection #2 administered in office today.  Follow-up in office in 1 week for next injection. Call with changes or concerns.    Patient was given instructions and counseling regarding his condition or for health maintenance advice. Please see specific information pulled into the AVS if appropriate.

## 2023-08-22 ENCOUNTER — OFFICE VISIT (OUTPATIENT)
Dept: ORTHOPEDIC SURGERY | Facility: CLINIC | Age: 63
End: 2023-08-22
Payer: COMMERCIAL

## 2023-08-22 VITALS
SYSTOLIC BLOOD PRESSURE: 121 MMHG | BODY MASS INDEX: 30.35 KG/M2 | WEIGHT: 229 LBS | HEART RATE: 83 BPM | OXYGEN SATURATION: 95 % | DIASTOLIC BLOOD PRESSURE: 80 MMHG | HEIGHT: 73 IN

## 2023-08-22 DIAGNOSIS — M17.12 OSTEOARTHRITIS OF LEFT KNEE, UNSPECIFIED OSTEOARTHRITIS TYPE: ICD-10-CM

## 2023-08-22 DIAGNOSIS — M17.11 PRIMARY OSTEOARTHRITIS OF RIGHT KNEE: Primary | ICD-10-CM

## 2023-08-22 RX ORDER — HYALURONATE SODIUM 10 MG/ML
20 SYRINGE (ML) INTRAARTICULAR
Status: COMPLETED | OUTPATIENT
Start: 2023-08-22 | End: 2023-08-22

## 2023-08-22 RX ADMIN — Medication 20 MG: at 14:52

## 2023-08-22 NOTE — PROGRESS NOTES
"Chief Complaint  Follow-up of the Right Knee and Follow-up of the Left Knee    Subjective      Osmani Bucio presents to [facility] for follow-up of bilateral knee pain and osteoarthritis.  Patient has managed this conservatively with intermittent injections in the past.  He was last seen in office on 8/15/2023 and received bilateral knee Euflexxa injections #2.  He presents today independently ambulatory without use of assistive device.  Reports that he has not yet noted any change in his symptoms.  He would like to proceed with bilateral knee Euflexxa injections #3 today.    Objective   Allergies   Allergen Reactions    Codeine Other (See Comments)     Fast heart rate  Other reaction(s): heart palpatations, profuse sweating    Morphine Nausea And Vomiting       Vital Signs:   /80   Pulse 83   Ht 185.4 cm (73\")   Wt 104 kg (229 lb)   SpO2 95%   BMI 30.21 kg/mý       Physical Exam    Constitutional: Awake, alert. Well nourished appearance.    Integumentary: Warm, dry, intact. No obvious rashes.    HENT: Atraumatic, normocephalic.   Respiratory: Non labored respirations .   Cardiovascular: Intact peripheral pulses.    Psychiatric: Normal mood and affect. A&O X3    Ortho Exam  Bilateral knees: Skin is warm, dry, and intact.  Tenderness to palpation of joint lines.  Crepitus with ROM.  Full knee extension and flexion to 120-125 degrees bilaterally.  Full plantarflexion and dorsiflexion of the ankles.  Sensation intact light touch.  Distal neurovascular intact.  Smooth sit to stand transition.  Patient fully weightbearing with nonantalgic gait.    Imaging Results (Most Recent)       None             Large Joint Arthrocentesis  Date/Time: 8/22/2023 2:52 PM  Consent given by: patient  Site marked: site marked  Timeout: Immediately prior to procedure a time out was called to verify the correct patient, procedure, equipment, support staff and site/side marked as required "   Supporting Documentation  Indications: pain   Procedure Details  Location: -   Location: RIGHT KNEE.Needle gauge: 21G.  Medications administered: 20 mg Sodium Hyaluronate (Viscosup) 20 MG/2ML  Patient tolerance: patient tolerated the procedure well with no immediate complications      Large Joint Arthrocentesis  Date/Time: 8/22/2023 2:52 PM  Consent given by: patient  Site marked: site marked  Timeout: Immediately prior to procedure a time out was called to verify the correct patient, procedure, equipment, support staff and site/side marked as required   Supporting Documentation  Indications: pain   Procedure Details  Location: -   Location: LEFT KNEE.Needle gauge: 21G.  Medications administered: 20 mg Sodium Hyaluronate (Viscosup) 20 MG/2ML  Patient tolerance: patient tolerated the procedure well with no immediate complications            Assessment and Plan   Problem List Items Addressed This Visit          Musculoskeletal and Injuries    OA (osteoarthritis) of knee - Primary       Follow Up   No follow-ups on file.    Tobacco Use: Low Risk     Smoking Tobacco Use: Never    Smokeless Tobacco Use: Never    Passive Exposure: Not on file       Patient is a non-smoker.  Did not discuss tobacco cessation options.    Patient Instructions   Bilateral knee Euflexxa injections #3 administered in office today for completion of injection series.  Patient can consider steroid injections after 6 weeks, if needed.  Call with changes or concerns.  Patient was given instructions and counseling regarding his condition or for health maintenance advice. Please see specific information pulled into the AVS if appropriate.

## 2023-08-22 NOTE — PATIENT INSTRUCTIONS
Bilateral knee Euflexxa injections #3 administered in office today for completion of injection series.  Patient can consider steroid injections after 6 weeks, if needed.  Call with changes or concerns.

## 2023-08-30 ENCOUNTER — OFFICE VISIT (OUTPATIENT)
Dept: FAMILY MEDICINE CLINIC | Facility: CLINIC | Age: 63
End: 2023-08-30
Payer: COMMERCIAL

## 2023-08-30 VITALS
HEART RATE: 83 BPM | OXYGEN SATURATION: 98 % | DIASTOLIC BLOOD PRESSURE: 78 MMHG | SYSTOLIC BLOOD PRESSURE: 112 MMHG | WEIGHT: 226.9 LBS | TEMPERATURE: 98.4 F | BODY MASS INDEX: 30.07 KG/M2 | HEIGHT: 73 IN

## 2023-08-30 DIAGNOSIS — E11.65 TYPE 2 DIABETES MELLITUS WITH HYPERGLYCEMIA, WITHOUT LONG-TERM CURRENT USE OF INSULIN: ICD-10-CM

## 2023-08-30 DIAGNOSIS — E11.65 TYPE 2 DIABETES MELLITUS WITH HYPERGLYCEMIA, WITHOUT LONG-TERM CURRENT USE OF INSULIN: Primary | ICD-10-CM

## 2023-08-30 DIAGNOSIS — B37.9 YEAST INFECTION: ICD-10-CM

## 2023-08-30 DIAGNOSIS — M19.90 ARTHRITIS: ICD-10-CM

## 2023-08-30 DIAGNOSIS — G62.9 POLYNEUROPATHY: ICD-10-CM

## 2023-08-30 DIAGNOSIS — M17.11 PRIMARY OSTEOARTHRITIS OF RIGHT KNEE: ICD-10-CM

## 2023-08-30 DIAGNOSIS — K21.9 GASTROESOPHAGEAL REFLUX DISEASE WITHOUT ESOPHAGITIS: ICD-10-CM

## 2023-08-30 DIAGNOSIS — E78.2 MIXED HYPERLIPIDEMIA: ICD-10-CM

## 2023-08-30 RX ORDER — GABAPENTIN 300 MG/1
300 CAPSULE ORAL 3 TIMES DAILY
Qty: 90 CAPSULE | Refills: 2 | Status: SHIPPED | OUTPATIENT
Start: 2023-08-30

## 2023-08-30 RX ORDER — ROSUVASTATIN CALCIUM 5 MG/1
5 TABLET, COATED ORAL DAILY
Qty: 90 TABLET | Refills: 1 | Status: SHIPPED | OUTPATIENT
Start: 2023-08-30

## 2023-08-30 RX ORDER — METFORMIN HYDROCHLORIDE 500 MG/1
500 TABLET, EXTENDED RELEASE ORAL
Qty: 90 TABLET | Refills: 1 | Status: SHIPPED | OUTPATIENT
Start: 2023-08-30

## 2023-08-30 RX ORDER — FLUCONAZOLE 150 MG/1
150 TABLET ORAL
Qty: 2 TABLET | Refills: 0 | Status: SHIPPED | OUTPATIENT
Start: 2023-08-30

## 2023-08-30 RX ORDER — PANTOPRAZOLE SODIUM 40 MG/1
40 TABLET, DELAYED RELEASE ORAL DAILY
Qty: 90 TABLET | Refills: 1 | Status: SHIPPED | OUTPATIENT
Start: 2023-08-30

## 2023-08-30 RX ORDER — GLIPIZIDE 5 MG/1
5 TABLET ORAL
Qty: 180 TABLET | Refills: 1 | Status: SHIPPED | OUTPATIENT
Start: 2023-08-30

## 2023-08-30 RX ORDER — CYCLOBENZAPRINE HCL 10 MG
10 TABLET ORAL 2 TIMES DAILY PRN
Qty: 30 TABLET | Refills: 0 | Status: SHIPPED | OUTPATIENT
Start: 2023-08-30

## 2023-08-30 NOTE — PROGRESS NOTES
"Osmani Bucio presents to [facility] who presents to the clinic for 3-month follow-up.      History of Present Illness  This is a 63-year-old male who presents to the clinic for 3-month follow-up.    Diabetes mellitus type 2: Patient states that he has been having a hard time tolerating the metformin, has not been taking it routinely, as it does cause him some pretty severe GI upset.  Patient also states that he has been taking his Jardiance as prescribed, but he has been getting a lot of yeast infections.  States that he does currently have a yeast infection in his private area currently, has had this recently as well.  Has not had his eye exam, has not had his foot exam, has not been following diet regimen.  Patient is noncompliant.  Refuses to take injectable medications.    Hypertension/hyperlipidemia: Stable.  Blood pressure stable today at 112/78.  Is due for repeat lipid panel, remains on rosuvastatin daily.    Arthritis/neuropathy: Patient has been following up with Ortho in regards to his right knee arthritis, has been getting gel injections, most recently last week.  States that he does not feel like they are doing much good, knows that he needs a knee replacement, but they would not do that unless his diabetes is better controlled.  Does feel like the gabapentin helps a lot with his neuropathy pain, does need a refill.    Is due for blood work, will order today.  Otherwise up-to-date on other preventative screenings.    The following portions of the patient's history were personally reviewed and updated as appropriate: allergies, current medications, past medical history, past surgical history, past family history, and past social history.       Objective   Vital Signs:   /78 (BP Location: Left arm, Patient Position: Sitting, Cuff Size: Adult)   Pulse 83   Temp 98.4 øF (36.9 øC) (Temporal)   Ht 185.4 cm (73\")   Wt 103 kg (226 lb 14.4 oz)   SpO2 98%   BMI " 29.94 kg/mý     Body mass index is 29.94 kg/mý.    All labs, imaging, test results, and specialty provider notes reviewed with patient.     Physical Exam  Vitals reviewed.   Constitutional:       Appearance: Normal appearance.   Cardiovascular:      Rate and Rhythm: Normal rate and regular rhythm.      Pulses: Normal pulses.      Heart sounds: Normal heart sounds.   Pulmonary:      Effort: Pulmonary effort is normal.      Breath sounds: Normal breath sounds.   Neurological:      General: No focal deficit present.      Mental Status: He is alert and oriented to person, place, and time.                BMI is >= 25 and <30. (Overweight) The following options were offered after discussion;: exercise counseling/recommendations and nutrition counseling/recommendations            Assessment and Plan:  Diagnoses and all orders for this visit:    1. Type 2 diabetes mellitus with hyperglycemia, without long-term current use of insulin (Primary)  Assessment & Plan:  Patient is noncompliant.    Discussed with him the risks of not controlling blood sugar to include kidney damage, vision damage, heart conditions, and discussed that we need to get his diabetes controlled.  We will continue him on metformin, he refuses injectable medications and the Jardiance is likely contributing to yeast infections, so I will transition him over to glipizide.  He is due for repeat blood work, will order today.    Orders:  -     metFORMIN ER (GLUCOPHAGE-XR) 500 MG 24 hr tablet; Take 1 tablet by mouth Daily With Breakfast.  Dispense: 90 tablet; Refill: 1  -     glipizide (Glucotrol) 5 MG tablet; Take 1 tablet by mouth 2 (Two) Times a Day Before Meals.  Dispense: 180 tablet; Refill: 1  -     CBC Auto Differential; Future  -     Comprehensive Metabolic Panel; Future  -     TSH Rfx On Abnormal To Free T4; Future  -     Urinalysis With Culture If Indicated -; Future  -     Microalbumin / Creatinine Urine Ratio - Urine, Clean Catch; Future  -      Hemoglobin A1c; Future    2. Arthritis  Assessment & Plan:  Continue with meloxicam and follow-up with Ortho.    Orders:  -     cyclobenzaprine (FLEXERIL) 10 MG tablet; Take 1 tablet by mouth 2 (Two) Times a Day As Needed for Muscle Spasms.  Dispense: 30 tablet; Refill: 0    3. Primary osteoarthritis of right knee    4. Mixed hyperlipidemia  -     rosuvastatin (CRESTOR) 5 MG tablet; Take 1 tablet by mouth Daily.  Dispense: 90 tablet; Refill: 1  -     Lipid Panel; Future    5. Yeast infection    6. Gastroesophageal reflux disease without esophagitis  Assessment & Plan:  Continue on protonix    Orders:  -     pantoprazole (PROTONIX) 40 MG EC tablet; Take 1 tablet by mouth Daily.  Dispense: 90 tablet; Refill: 1    7. Polyneuropathy  Assessment & Plan:  Continue on gabapentin at current dose, UDS/consent reviewed, Nikolai reviewed.    Orders:  -     gabapentin (NEURONTIN) 300 MG capsule; Take 1 capsule by mouth 3 (Three) Times a Day.  Dispense: 90 capsule; Refill: 2    8. Type 2 diabetes mellitus with hyperglycemia, without long-term current use of insulin  Comments:  uncontrolled.  A1c increased from previous since being off of metformin.  We will try extended release metformin at low dose to prevent GI side effects.    Assessment & Plan:  Patient is noncompliant.    Discussed with him the risks of not controlling blood sugar to include kidney damage, vision damage, heart conditions, and discussed that we need to get his diabetes controlled.  We will continue him on metformin, he refuses injectable medications and the Jardiance is likely contributing to yeast infections, so I will transition him over to glipizide.  He is due for repeat blood work, will order today.    Orders:  -     metFORMIN ER (GLUCOPHAGE-XR) 500 MG 24 hr tablet; Take 1 tablet by mouth Daily With Breakfast.  Dispense: 90 tablet; Refill: 1  -     glipizide (Glucotrol) 5 MG tablet; Take 1 tablet by mouth 2 (Two) Times a Day Before Meals.  Dispense: 180  tablet; Refill: 1  -     CBC Auto Differential; Future  -     Comprehensive Metabolic Panel; Future  -     TSH Rfx On Abnormal To Free T4; Future  -     Urinalysis With Culture If Indicated -; Future  -     Microalbumin / Creatinine Urine Ratio - Urine, Clean Catch; Future  -     Hemoglobin A1c; Future    Other orders  -     fluconazole (Diflucan) 150 MG tablet; Take 1 tablet by mouth Every 72 (Seventy-Two) Hours.  Dispense: 2 tablet; Refill: 0          Follow Up:  No follow-ups on file.    Patient was given instructions and counseling regarding his condition or for health maintenance advice. Please see specific information pulled into the AVS if appropriate.

## 2023-08-30 NOTE — ASSESSMENT & PLAN NOTE
Patient is noncompliant.    Discussed with him the risks of not controlling blood sugar to include kidney damage, vision damage, heart conditions, and discussed that we need to get his diabetes controlled.  We will continue him on metformin, he refuses injectable medications and the Jardiance is likely contributing to yeast infections, so I will transition him over to glipizide.  He is due for repeat blood work, will order today.

## 2023-12-04 ENCOUNTER — TELEPHONE (OUTPATIENT)
Dept: FAMILY MEDICINE CLINIC | Facility: CLINIC | Age: 63
End: 2023-12-04
Payer: COMMERCIAL

## 2023-12-04 NOTE — TELEPHONE ENCOUNTER
Left message with spouse to return call.     Patient missed their appointment scheduled on 11/30/23 @3pm.     Would patient like to be rescheduled?    No show letter sent to patient either via [employer] or mail.     HUB TO SHARE AND RELAY

## 2024-04-11 ENCOUNTER — TELEPHONE (OUTPATIENT)
Dept: FAMILY MEDICINE CLINIC | Facility: CLINIC | Age: 64
End: 2024-04-11
Payer: COMMERCIAL

## 2024-04-11 NOTE — TELEPHONE ENCOUNTER
Tried reaching patient, number not in service  Confirming appointment with  for 4/12/24  Hub to relay

## 2024-04-12 ENCOUNTER — HOSPITAL ENCOUNTER (OUTPATIENT)
Dept: GENERAL RADIOLOGY | Facility: HOSPITAL | Age: 64
Discharge: HOME | End: 2024-04-12
Payer: COMMERCIAL

## 2024-04-12 ENCOUNTER — OFFICE VISIT (OUTPATIENT)
Dept: FAMILY MEDICINE CLINIC | Facility: CLINIC | Age: 64
End: 2024-04-12
Payer: COMMERCIAL

## 2024-04-12 VITALS
RESPIRATION RATE: 16 BRPM | SYSTOLIC BLOOD PRESSURE: 122 MMHG | WEIGHT: 222 LBS | DIASTOLIC BLOOD PRESSURE: 70 MMHG | HEIGHT: 73 IN | BODY MASS INDEX: 29.42 KG/M2

## 2024-04-12 DIAGNOSIS — M54.50 CHRONIC BILATERAL LOW BACK PAIN WITHOUT SCIATICA: Primary | ICD-10-CM

## 2024-04-12 DIAGNOSIS — N48.1 BALANITIS: ICD-10-CM

## 2024-04-12 DIAGNOSIS — G89.29 CHRONIC BILATERAL LOW BACK PAIN WITHOUT SCIATICA: Primary | ICD-10-CM

## 2024-04-12 DIAGNOSIS — G89.29 CHRONIC BILATERAL LOW BACK PAIN WITHOUT SCIATICA: ICD-10-CM

## 2024-04-12 DIAGNOSIS — M54.50 CHRONIC BILATERAL LOW BACK PAIN WITHOUT SCIATICA: ICD-10-CM

## 2024-04-12 PROCEDURE — 99214 OFFICE O/P EST MOD 30 MIN: CPT | Performed by: STUDENT IN AN ORGANIZED HEALTH CARE EDUCATION/TRAINING PROGRAM

## 2024-04-12 PROCEDURE — 1160F RVW MEDS BY RX/DR IN RCRD: CPT | Performed by: STUDENT IN AN ORGANIZED HEALTH CARE EDUCATION/TRAINING PROGRAM

## 2024-04-12 PROCEDURE — 72100 X-RAY EXAM L-S SPINE 2/3 VWS: CPT

## 2024-04-12 PROCEDURE — 1159F MED LIST DOCD IN RCRD: CPT | Performed by: STUDENT IN AN ORGANIZED HEALTH CARE EDUCATION/TRAINING PROGRAM

## 2024-04-12 RX ORDER — CLOTRIMAZOLE AND BETAMETHASONE DIPROPIONATE 10; .64 MG/G; MG/G
1 CREAM TOPICAL 2 TIMES DAILY
Qty: 45 G | Refills: 0 | Status: SHIPPED | OUTPATIENT
Start: 2024-04-12

## 2024-04-12 RX ORDER — TRAMADOL HYDROCHLORIDE 50 MG/1
50 TABLET ORAL EVERY 6 HOURS PRN
Qty: 20 TABLET | Refills: 0 | Status: SHIPPED | OUTPATIENT
Start: 2024-04-12

## 2024-04-12 RX ORDER — FLUCONAZOLE 150 MG/1
150 TABLET ORAL
Qty: 2 TABLET | Refills: 0 | Status: SHIPPED | OUTPATIENT
Start: 2024-04-12

## 2024-04-12 RX ORDER — METHOCARBAMOL 750 MG/1
750 TABLET, FILM COATED ORAL 3 TIMES DAILY PRN
Qty: 30 TABLET | Refills: 1 | Status: SHIPPED | OUTPATIENT
Start: 2024-04-12

## 2024-04-12 NOTE — PROGRESS NOTES
"Chief Complaint  Chronic back pain    Subjective         Osmani Bucio is a 63 y.o. male who presents to [facility]    63 years old comes to the clinic today to follow-up on health concerns/acute visit.    Patient is complaining of acute worsening of chronic lower back pain.  Patient has a history of lower back surgery, recently has been working with daughter to renovate her house.  Complaining of back spasm, nonradiating pain, gets worse with certain position and movements.  Seen by chiropractor but not significantly helping.  Denies any lower urinary tract symptoms or any bowel movement changes or any incontinence.  Denies any direct trauma/fever/pinpoint tenderness or any erythema.  Reports no abdominal pain or any nausea/vomiting/fever.    Also complaining of yeast infection around penis skin which started after starting Jardiance.  Patient stopped Jardiance but still having some mild infection around penis.  Patient was treated with Diflucan in the past with good response.  Denies any dysuria/increased frequency/blood in urine or any other skin changes.    Objective   Vital Signs:   Vitals:    04/12/24 1554   BP: 122/70   Resp: 16   Weight: 101 kg (222 lb)   Height: 185.4 cm (73\")      Body mass index is 29.29 kg/m².   Wt Readings from Last 3 Encounters:   04/12/24 101 kg (222 lb)   11/19/23 107 kg (235 lb)   08/30/23 103 kg (226 lb 14.4 oz)      BP Readings from Last 3 Encounters:   04/12/24 122/70   11/19/23 130/83   08/30/23 112/78        Patient Care Team:  Afua Varner APRN as PCP - General (Nurse Practitioner)     Physical Exam  Abdominal:      Palpations: Abdomen is soft.      Tenderness: There is no abdominal tenderness.   Musculoskeletal:      Comments: Lower back exam; significant stiffness noted, no tenderness, no erythema.                            Assessment and Plan   Diagnoses and all orders for this visit:    1. Chronic bilateral low back pain " without sciatica (Primary)  Comments:  Conservative management/lifestyle modifications, x-ray, acute treatment discussed.  May need neurosurgery/MRI if any worsening or no improvement.  Orders:  -     XR Spine Lumbar 2 or 3 View; Future  -     traMADol (ULTRAM) 50 MG tablet; Take 1 tablet by mouth Every 6 (Six) Hours As Needed for Moderate Pain.  Dispense: 20 tablet; Refill: 0  -     methocarbamol (ROBAXIN) 750 MG tablet; Take 1 tablet by mouth 3 (Three) Times a Day As Needed for Muscle Spasms.  Dispense: 30 tablet; Refill: 1    2. Balanitis  Comments:  Will start patient on Diflucan and Lotrisone.  Orders:  -     clotrimazole-betamethasone (Lotrisone) 1-0.05 % cream; Apply 1 Application topically to the appropriate area as directed 2 (Two) Times a Day.  Dispense: 45 g; Refill: 0  -     fluconazole (Diflucan) 150 MG tablet; Take 1 tablet by mouth Every 3 (Three) Days.  Dispense: 2 tablet; Refill: 0      Patient to follow-up in a week if not improved lower back pain.  Patient to call if any worsening or no improvement, also ER precautions discussed if any worsening with lower back pain.    Tobacco Use: Low Risk  (4/12/2024)    Patient History     Smoking Tobacco Use: Never     Smokeless Tobacco Use: Never     Passive Exposure: Not on file            Follow Up   Return in about 1 month (around 5/12/2024) for Annual physical with Afua .  Patient was given instructions and counseling regarding his condition or for health maintenance advice. Please see specific information pulled into the AVS if appropriate.

## 2024-04-29 ENCOUNTER — TELEPHONE (OUTPATIENT)
Dept: FAMILY MEDICINE CLINIC | Facility: CLINIC | Age: 64
End: 2024-04-29
Payer: COMMERCIAL

## 2024-04-29 NOTE — TELEPHONE ENCOUNTER
Caller: ALLEN CABEZAS    Relationship to patient: Emergency Contact    Best call back number: [phone]     Chief complaint: BACK PAIN    Type of visit: OFFICE VISIT, WANTS TO MOVE UP PHYSICAL    Requested date: ASAP     If rescheduling, when is the original appointment: 5/13/24     Additional notes:  PATIENT'S SPOUSE STATES THAT PATIENT IS NEEDING TO GET IN SOONER FOR HIS BACK PAIN. PATIENT WAS PUT ON MEDICATION BUT IT IS NOT IMPROVING, IT IS ACTUALLY GETTING WORSE. PER SPOUSE, IT IS HARD FOR HIM TO WALK OR GET OUT OF BED.

## 2024-05-01 ENCOUNTER — OFFICE VISIT (OUTPATIENT)
Dept: FAMILY MEDICINE CLINIC | Facility: CLINIC | Age: 64
End: 2024-05-01
Payer: COMMERCIAL

## 2024-05-01 VITALS
OXYGEN SATURATION: 97 % | HEIGHT: 73 IN | WEIGHT: 223.7 LBS | TEMPERATURE: 98.1 F | DIASTOLIC BLOOD PRESSURE: 80 MMHG | HEART RATE: 66 BPM | BODY MASS INDEX: 29.65 KG/M2 | SYSTOLIC BLOOD PRESSURE: 118 MMHG

## 2024-05-01 DIAGNOSIS — Z00.00 ANNUAL PHYSICAL EXAM: Primary | ICD-10-CM

## 2024-05-01 DIAGNOSIS — M19.90 ARTHRITIS: ICD-10-CM

## 2024-05-01 DIAGNOSIS — Z13.220 SCREENING FOR LIPID DISORDERS: ICD-10-CM

## 2024-05-01 DIAGNOSIS — E11.65 TYPE 2 DIABETES MELLITUS WITH HYPERGLYCEMIA, WITHOUT LONG-TERM CURRENT USE OF INSULIN: ICD-10-CM

## 2024-05-01 DIAGNOSIS — M54.50 CHRONIC BILATERAL LOW BACK PAIN WITHOUT SCIATICA: ICD-10-CM

## 2024-05-01 DIAGNOSIS — G89.29 CHRONIC BILATERAL LOW BACK PAIN WITHOUT SCIATICA: ICD-10-CM

## 2024-05-01 PROCEDURE — 1159F MED LIST DOCD IN RCRD: CPT

## 2024-05-01 PROCEDURE — 99396 PREV VISIT EST AGE 40-64: CPT

## 2024-05-01 PROCEDURE — 1160F RVW MEDS BY RX/DR IN RCRD: CPT

## 2024-05-01 RX ORDER — METHYLPREDNISOLONE ACETATE 80 MG/ML
80 INJECTION, SUSPENSION INTRA-ARTICULAR; INTRALESIONAL; INTRAMUSCULAR; SOFT TISSUE ONCE
Status: SHIPPED | OUTPATIENT
Start: 2024-05-01

## 2024-05-01 RX ORDER — METHOCARBAMOL 750 MG/1
750 TABLET, FILM COATED ORAL 3 TIMES DAILY PRN
Qty: 90 TABLET | Refills: 3 | Status: SHIPPED | OUTPATIENT
Start: 2024-05-01

## 2024-05-01 RX ORDER — METFORMIN HYDROCHLORIDE 500 MG/1
500 TABLET, EXTENDED RELEASE ORAL
Qty: 90 TABLET | Refills: 1 | Status: SHIPPED | OUTPATIENT
Start: 2024-05-01

## 2024-05-01 NOTE — PROGRESS NOTES
Osmani Bucio presents to [facility] with complaints of worsening back pain and annual physical.      History of Present Illness  This is a 63-year-old male who presents to clinic for annual physical and with complaints of worsening lower back pain.    Chronic low back pain: Patient has had a chronic issues with his lower back, actually had a spinal fusion several years ago when his L5-S1 area, and has had intermittent pain ever since.  Patient states that this has happened 1 other time prior to this, but he has been in a constant state of pain for at least the past 3 to 4 weeks.  Seen another provider in this office, who provided him with a muscle relaxer, tramadol, and states that those medications gave him a little relief but he is still in quite a bit of pain.  Patient states that its to the point where when he goes to stand up, he cannot fully extend because his back gets so stiff that is almost unable to move.  He had an x-ray done at last visit as well, shows no failure with the hardware in his back, but does show chronic degenerative change.  States that he needs some relief, has done the injections in his back before, the only thing that is giving him a thorough relief in the past has been either a Medrol Dosepak or a steroid injection.  Would like to consider that if able.    Diabetes mellitus type 2: Patient has not been taking his medication as previously prescribed.  States that he is not had any adverse effects to it, has just not been consistent with taking his medication on a daily basis.  Patient has had significantly elevated A1c's, has never been really fully compliant with medications, refuses other medications or addition of medications.    Patient is due for repeat blood work, will get this done prior to his next appointment.  Patient refuses vaccines/immunizations, otherwise up-to-date on other preventative screenings.    The following portions of the  "patient's history were personally reviewed and updated as appropriate: allergies, current medications, past medical history, past surgical history, past family history, and past social history.       Objective   Vital Signs:   /80 (BP Location: Left arm, Patient Position: Sitting, Cuff Size: Adult)   Pulse 66   Temp 98.1 °F (36.7 °C)   Ht 185.4 cm (72.99\")   Wt 101 kg (223 lb 11.2 oz)   SpO2 97%   BMI 29.52 kg/m²     Body mass index is 29.52 kg/m².    All labs, imaging, test results, and specialty provider notes reviewed with patient.     Physical Exam  Vitals reviewed.   Constitutional:       Appearance: Normal appearance.   Cardiovascular:      Rate and Rhythm: Normal rate and regular rhythm.      Pulses: Normal pulses.      Heart sounds: Normal heart sounds.   Pulmonary:      Effort: Pulmonary effort is normal.      Breath sounds: Normal breath sounds.   Neurological:      General: No focal deficit present.      Mental Status: He is alert and oriented to person, place, and time.                               Assessment and Plan:  Diagnoses and all orders for this visit:    1. Annual physical exam (Primary)    2. Chronic bilateral low back pain without sciatica  Assessment & Plan:  Discussed risks of steroid injections and increasing blood sugar, but patient states that the only thing that is giving him relief. Will provide this for him. Rx alternative cream sent in.  Refilled muscle relaxers.  May need MRI if not improving.    Orders:  -     methocarbamol (ROBAXIN) 750 MG tablet; Take 1 tablet by mouth 3 (Three) Times a Day As Needed for Muscle Spasms.  Dispense: 90 tablet; Refill: 3  -     methylPREDNISolone acetate (DEPO-medrol) injection 80 mg  -     Ibuprofen 3 %, Gabapentin 10 %, Baclofen 2 %, lidocaine 4 %; Apply 1-2 g topically to the appropriate area as directed 3 (Three) to 4 (Four) times daily.  Dispense: 90 g; Refill: 2    3. Type 2 diabetes mellitus with hyperglycemia, without long-term " current use of insulin  Assessment & Plan:  Not controlled, patient noncompliant with medications, does not take medications as prescribed.  Discussed the importance of this, the risks of not taking, to include kidney failure, high risk for cardiovascular event, vascular issues to include amputation, vision changes, discussed all of this and patient states that he is going to try to do better.  Will follow-up with A1c in 3 months.  Patient currently on metformin extended release, and glipizide.  Refuses injections and other medications.  Had increased yeast infections with Jardiance.    Orders:  -     metFORMIN ER (GLUCOPHAGE-XR) 500 MG 24 hr tablet; Take 1 tablet by mouth Daily With Breakfast.  Dispense: 90 tablet; Refill: 1    4. Arthritis  -     Ibuprofen 3 %, Gabapentin 10 %, Baclofen 2 %, lidocaine 4 %; Apply 1-2 g topically to the appropriate area as directed 3 (Three) to 4 (Four) times daily.  Dispense: 90 g; Refill: 2    5. Screening for lipid disorders    Anticipatory Guidelines discussed with patient.    Discussed getting adequate exercise, reduced TV/electronic time, car safety including seat belt use, sexual activity (if applicable), and smoking/alcohol use (if applicable).      Follow Up:  Return in about 3 months (around 8/1/2024).    Patient was given instructions and counseling regarding his condition or for health maintenance advice. Please see specific information pulled into the AVS if appropriate.

## 2024-05-01 NOTE — ASSESSMENT & PLAN NOTE
Discussed risks of steroid injections and increasing blood sugar, but patient states that the only thing that is giving him relief. Will provide this for him. Rx alternative cream sent in.  Refilled muscle relaxers.  May need MRI if not improving.

## 2024-05-01 NOTE — ASSESSMENT & PLAN NOTE
Not controlled, patient noncompliant with medications, does not take medications as prescribed.  Discussed the importance of this, the risks of not taking, to include kidney failure, high risk for cardiovascular event, vascular issues to include amputation, vision changes, discussed all of this and patient states that he is going to try to do better.  Will follow-up with A1c in 3 months.  Patient currently on metformin extended release, and glipizide.  Refuses injections and other medications.  Had increased yeast infections with Jardiance.

## 2024-05-06 ENCOUNTER — TELEPHONE (OUTPATIENT)
Dept: FAMILY MEDICINE CLINIC | Facility: CLINIC | Age: 64
End: 2024-05-06
Payer: COMMERCIAL

## 2024-05-07 ENCOUNTER — OFFICE VISIT (OUTPATIENT)
Dept: FAMILY MEDICINE CLINIC | Facility: CLINIC | Age: 64
End: 2024-05-07
Payer: COMMERCIAL

## 2024-05-07 VITALS
TEMPERATURE: 97.3 F | WEIGHT: 219.1 LBS | HEART RATE: 65 BPM | SYSTOLIC BLOOD PRESSURE: 118 MMHG | BODY MASS INDEX: 29.04 KG/M2 | OXYGEN SATURATION: 98 % | DIASTOLIC BLOOD PRESSURE: 82 MMHG | HEIGHT: 73 IN

## 2024-05-07 DIAGNOSIS — Z79.899 MEDICATION MANAGEMENT: ICD-10-CM

## 2024-05-07 DIAGNOSIS — M51.36 DDD (DEGENERATIVE DISC DISEASE), LUMBAR: Primary | ICD-10-CM

## 2024-05-07 DIAGNOSIS — M19.90 ARTHRITIS: ICD-10-CM

## 2024-05-07 PROBLEM — M51.369 DDD (DEGENERATIVE DISC DISEASE), LUMBAR: Status: ACTIVE | Noted: 2024-05-07

## 2024-05-07 LAB
AMPHET+METHAMPHET UR QL: NEGATIVE
AMPHETAMINE INTERNAL CONTROL: NORMAL
AMPHETAMINES UR QL: NEGATIVE
BARBITURATE INTERNAL CONTROL: NORMAL
BARBITURATES UR QL SCN: NEGATIVE
BENZODIAZ UR QL SCN: NEGATIVE
BENZODIAZEPINE INTERNAL CONTROL: NORMAL
BUPRENORPHINE INTERNAL CONTROL: NORMAL
BUPRENORPHINE SERPL-MCNC: NEGATIVE NG/ML
CANNABINOIDS SERPL QL: NEGATIVE
COCAINE INTERNAL CONTROL: NORMAL
COCAINE UR QL: NEGATIVE
EXPIRATION DATE: NORMAL
Lab: NORMAL
MDMA (ECSTASY) INTERNAL CONTROL: NORMAL
MDMA UR QL SCN: NEGATIVE
METHADONE INTERNAL CONTROL: NORMAL
METHADONE UR QL SCN: NEGATIVE
METHAMPHETAMINE INTERNAL CONTROL: NORMAL
MORPHINE INTERNAL CONTROL: NORMAL
MORPHINE/OPIATES SCREEN, URINE: NEGATIVE
OXYCODONE INTERNAL CONTROL: NORMAL
OXYCODONE UR QL SCN: NEGATIVE
PCP UR QL SCN: NEGATIVE
PHENCYCLIDINE INTERNAL CONTROL: NORMAL
THC INTERNAL CONTROL: NORMAL

## 2024-05-07 PROCEDURE — 99213 OFFICE O/P EST LOW 20 MIN: CPT

## 2024-05-07 PROCEDURE — 1159F MED LIST DOCD IN RCRD: CPT

## 2024-05-07 PROCEDURE — 80305 DRUG TEST PRSMV DIR OPT OBS: CPT

## 2024-05-07 PROCEDURE — 1125F AMNT PAIN NOTED PAIN PRSNT: CPT

## 2024-05-07 PROCEDURE — 1160F RVW MEDS BY RX/DR IN RCRD: CPT

## 2024-05-07 RX ORDER — TRAMADOL HYDROCHLORIDE 50 MG/1
50 TABLET ORAL EVERY 8 HOURS PRN
Qty: 90 TABLET | Refills: 1 | Status: SHIPPED | OUTPATIENT
Start: 2024-05-07

## 2024-05-07 RX ORDER — METHOCARBAMOL 750 MG/1
750 TABLET, FILM COATED ORAL 3 TIMES DAILY PRN
Qty: 90 TABLET | Refills: 3 | Status: SHIPPED | OUTPATIENT
Start: 2024-05-07

## 2024-05-29 DIAGNOSIS — G62.9 POLYNEUROPATHY: ICD-10-CM

## 2024-05-29 NOTE — TELEPHONE ENCOUNTER
UPCOMING APPTS  With Family Medicine (MARGARITA Aponte)  08/01/2024 at 9:45 AM  LAST OFFICE VISIT - THIS DEPT  5/7/2024 Afua Varner APRN    Uds 5/7/24

## 2024-05-29 NOTE — TELEPHONE ENCOUNTER
Caller: JOCELYNNALLEN    Relationship: Emergency Contact    Best call back number: [phone]     Requested Prescriptions:   Requested Prescriptions     Pending Prescriptions Disp Refills    gabapentin (NEURONTIN) 300 MG capsule 90 capsule 2     Sig: Take 1 capsule by mouth 3 (Three) Times a Day.        Pharmacy where request should be sent:  [address] - [phone] [facility] [phone]  [phone]     Last office visit with prescribing clinician: 5/7/2024   Last telemedicine visit with prescribing clinician: Visit date not found   Next office visit with prescribing clinician: 8/1/2024       Does the patient have less than a 3 day supply:  [x] Yes  [] No    Would you like a call back once the refill request has been completed: [] Yes [x] No    If the office needs to give you a call back, can they leave a voicemail: [] Yes [x] No    Kaylie Coley, PCT   05/29/24 13:23 EDT

## 2024-05-30 RX ORDER — GABAPENTIN 300 MG/1
300 CAPSULE ORAL 3 TIMES DAILY
Qty: 90 CAPSULE | Refills: 2 | Status: SHIPPED | OUTPATIENT
Start: 2024-05-30

## 2024-06-03 ENCOUNTER — TELEPHONE (OUTPATIENT)
Dept: FAMILY MEDICINE CLINIC | Facility: CLINIC | Age: 64
End: 2024-06-03
Payer: COMMERCIAL

## 2024-06-03 NOTE — TELEPHONE ENCOUNTER
Patient is already doing [facility] today will be his 3rd visit he is aware of results and spoke verbal understanding Mri Had been cancelled

## 2024-06-03 NOTE — TELEPHONE ENCOUNTER
Patients MRI Lumbar Spine WO contrast denied due to no therapy records. Ok to cancel?  Patient scheduled for tomorrow.

## 2024-06-21 ENCOUNTER — OFFICE VISIT (OUTPATIENT)
Dept: ORTHOPEDIC SURGERY | Facility: CLINIC | Age: 64
End: 2024-06-21
Payer: COMMERCIAL

## 2024-06-21 VITALS
BODY MASS INDEX: 29.03 KG/M2 | HEIGHT: 73 IN | SYSTOLIC BLOOD PRESSURE: 132 MMHG | HEART RATE: 75 BPM | DIASTOLIC BLOOD PRESSURE: 83 MMHG | WEIGHT: 219 LBS | OXYGEN SATURATION: 97 %

## 2024-06-21 DIAGNOSIS — M17.0 BILATERAL PRIMARY OSTEOARTHRITIS OF KNEE: Primary | ICD-10-CM

## 2024-06-21 NOTE — PATIENT INSTRUCTIONS
Bilateral knee steroid injections administered in office today.  Advised on 3 months duration between injections.  Patient is diabetic and was counseled on need for close blood glucose monitoring over the course of the next 24 to 48 hours.  He verbalized understanding and agreement.  He does not wish to repeat viscosupplementation in the future.  Follow-up as needed.  Call changes or concerns.

## 2024-06-21 NOTE — PROGRESS NOTES
"Chief Complaint  Follow-up and Pain of the Left Knee and Follow-up and Pain of the Right Knee    Subjective      Osmani Bucio presents to [facility] for bilateral knee pain and osteoarthritis.  He has managed this conservatively with remittent injections in the past.  He was last seen in office on 8/22/2023 for completion of bilateral knee Euflexxa injection series.  He presents independently ambulatory without use of assistive device.  Reports that he has \"put off injections for as long as I could\".  He has been in physical therapy for treatment of his back, which he states has placed increased stress on his knees.  He would like to proceed with repeat injections in office today.    Objective   Allergies   Allergen Reactions    Codeine Other (See Comments) and Palpitations     Fast heart rate    Other reaction(s): heart palpatations, profuse sweating    Morphine Nausea And Vomiting       Vital Signs:   /83   Pulse 75   Ht 185.4 cm (73\")   Wt 99.3 kg (219 lb)   SpO2 97%   BMI 28.89 kg/m²       Physical Exam    Constitutional: Awake, alert. Well nourished appearance.    Integumentary: Warm, dry, intact. No obvious rashes.    HENT: Atraumatic, normocephalic.   Respiratory: Non labored respirations .   Cardiovascular: Intact peripheral pulses.    Psychiatric: Normal mood and affect. A&O X3    Ortho Exam  Bilateral knees: Skin is warm, dry, and intact.  Tenderness to palpation along medial and lateral joint lines.  There are palpable osteophytes and crepitus with ROM.  -2 to -3 degrees extension and flexion 120-125 degrees.  Full dorsiflexion plantarflexion of the ankles.  Sensation and distal neurovascular intact.  Patient fully weightbearing with mild antalgic gait.    Imaging Results (Most Recent)       None                      Assessment and Plan   Problem List Items Addressed This Visit    None  Visit Diagnoses       Bilateral primary osteoarthritis of knee    -  " Primary            Follow Up   No follow-ups on file.    Tobacco Use: Low Risk  (6/21/2024)    Patient History     Smoking Tobacco Use: Never     Smokeless Tobacco Use: Never     Passive Exposure: Not on file     Patient is a non-smoker.  Did not discuss tobacco cessation options.      Patient Instructions   Bilateral knee steroid injections administered in office today.  Advised on 3 months duration between injections.  Patient is diabetic and was counseled on need for close blood glucose monitoring over the course of the next 24 to 48 hours.  He verbalized understanding and agreement.  He does not wish to repeat viscosupplementation in the future.  Follow-up as needed.  Call changes or concerns.  Patient was given instructions and counseling regarding his condition or for health maintenance advice. Please see specific information pulled into the AVS if appropriate.

## 2024-07-15 DIAGNOSIS — M19.90 ARTHRITIS: ICD-10-CM

## 2024-07-15 DIAGNOSIS — M51.36 DDD (DEGENERATIVE DISC DISEASE), LUMBAR: ICD-10-CM

## 2024-07-15 RX ORDER — TRAMADOL HYDROCHLORIDE 50 MG/1
50 TABLET ORAL EVERY 8 HOURS PRN
Qty: 90 TABLET | Refills: 1 | Status: SHIPPED | OUTPATIENT
Start: 2024-07-15

## 2024-07-29 ENCOUNTER — LAB (OUTPATIENT)
Dept: LAB | Facility: HOSPITAL | Age: 64
End: 2024-07-29
Payer: COMMERCIAL

## 2024-07-29 DIAGNOSIS — E78.2 MIXED HYPERLIPIDEMIA: ICD-10-CM

## 2024-07-29 DIAGNOSIS — E11.65 TYPE 2 DIABETES MELLITUS WITH HYPERGLYCEMIA, WITHOUT LONG-TERM CURRENT USE OF INSULIN: ICD-10-CM

## 2024-07-29 LAB
ALBUMIN SERPL-MCNC: 4.1 G/DL (ref 3.5–5.2)
ALBUMIN UR-MCNC: 1.4 MG/DL
ALBUMIN/GLOB SERPL: 1.5 G/DL
ALP SERPL-CCNC: 56 U/L (ref 39–117)
ALT SERPL W P-5'-P-CCNC: 16 U/L (ref 1–41)
ANION GAP SERPL CALCULATED.3IONS-SCNC: 8.4 MMOL/L (ref 5–15)
AST SERPL-CCNC: 11 U/L (ref 1–40)
BASOPHILS # BLD AUTO: 0.06 10*3/MM3 (ref 0–0.2)
BASOPHILS NFR BLD AUTO: 0.8 % (ref 0–1.5)
BILIRUB SERPL-MCNC: 0.6 MG/DL (ref 0–1.2)
BILIRUB UR QL STRIP: NEGATIVE
BUN SERPL-MCNC: 9 MG/DL (ref 8–23)
BUN/CREAT SERPL: 8.4 (ref 7–25)
CALCIUM SPEC-SCNC: 9.3 MG/DL (ref 8.6–10.5)
CHLORIDE SERPL-SCNC: 102 MMOL/L (ref 98–107)
CHOLEST SERPL-MCNC: 165 MG/DL (ref 0–200)
CLARITY UR: CLEAR
CO2 SERPL-SCNC: 26.6 MMOL/L (ref 22–29)
COLOR UR: YELLOW
CREAT SERPL-MCNC: 1.07 MG/DL (ref 0.76–1.27)
CREAT UR-MCNC: 74 MG/DL
DEPRECATED RDW RBC AUTO: 42.2 FL (ref 37–54)
EGFRCR SERPLBLD CKD-EPI 2021: 78.5 ML/MIN/1.73
EOSINOPHIL # BLD AUTO: 0.13 10*3/MM3 (ref 0–0.4)
EOSINOPHIL NFR BLD AUTO: 1.8 % (ref 0.3–6.2)
ERYTHROCYTE [DISTWIDTH] IN BLOOD BY AUTOMATED COUNT: 12.9 % (ref 12.3–15.4)
GLOBULIN UR ELPH-MCNC: 2.7 GM/DL
GLUCOSE SERPL-MCNC: 343 MG/DL (ref 65–99)
GLUCOSE UR STRIP-MCNC: ABNORMAL MG/DL
HBA1C MFR BLD: 15.8 % (ref 4.8–5.6)
HCT VFR BLD AUTO: 47.6 % (ref 37.5–51)
HDLC SERPL-MCNC: 35 MG/DL (ref 40–60)
HGB BLD-MCNC: 15.7 G/DL (ref 13–17.7)
HGB UR QL STRIP.AUTO: NEGATIVE
HOLD SPECIMEN: NORMAL
IMM GRANULOCYTES # BLD AUTO: 0.06 10*3/MM3 (ref 0–0.05)
IMM GRANULOCYTES NFR BLD AUTO: 0.8 % (ref 0–0.5)
KETONES UR QL STRIP: ABNORMAL
LDLC SERPL CALC-MCNC: 104 MG/DL (ref 0–100)
LDLC/HDLC SERPL: 2.89 {RATIO}
LEUKOCYTE ESTERASE UR QL STRIP.AUTO: NEGATIVE
LYMPHOCYTES # BLD AUTO: 2.8 10*3/MM3 (ref 0.7–3.1)
LYMPHOCYTES NFR BLD AUTO: 37.8 % (ref 19.6–45.3)
MCH RBC QN AUTO: 30 PG (ref 26.6–33)
MCHC RBC AUTO-ENTMCNC: 33 G/DL (ref 31.5–35.7)
MCV RBC AUTO: 90.8 FL (ref 79–97)
MICROALBUMIN/CREAT UR: 18.9 MG/G (ref 0–29)
MONOCYTES # BLD AUTO: 0.59 10*3/MM3 (ref 0.1–0.9)
MONOCYTES NFR BLD AUTO: 8 % (ref 5–12)
NEUTROPHILS NFR BLD AUTO: 3.76 10*3/MM3 (ref 1.7–7)
NEUTROPHILS NFR BLD AUTO: 50.8 % (ref 42.7–76)
NITRITE UR QL STRIP: NEGATIVE
NRBC BLD AUTO-RTO: 0 /100 WBC (ref 0–0.2)
PH UR STRIP.AUTO: 6 [PH] (ref 5–8)
PLATELET # BLD AUTO: 244 10*3/MM3 (ref 140–450)
PMV BLD AUTO: 10.4 FL (ref 6–12)
POTASSIUM SERPL-SCNC: 4.6 MMOL/L (ref 3.5–5.2)
PROT SERPL-MCNC: 6.8 G/DL (ref 6–8.5)
PROT UR QL STRIP: NEGATIVE
RBC # BLD AUTO: 5.24 10*6/MM3 (ref 4.14–5.8)
SODIUM SERPL-SCNC: 137 MMOL/L (ref 136–145)
SP GR UR STRIP: >1.03 (ref 1–1.03)
TRIGL SERPL-MCNC: 145 MG/DL (ref 0–150)
TSH SERPL DL<=0.05 MIU/L-ACNC: 2.3 UIU/ML (ref 0.27–4.2)
UROBILINOGEN UR QL STRIP: ABNORMAL
VLDLC SERPL-MCNC: 26 MG/DL (ref 5–40)
WBC NRBC COR # BLD AUTO: 7.4 10*3/MM3 (ref 3.4–10.8)

## 2024-07-29 PROCEDURE — 82043 UR ALBUMIN QUANTITATIVE: CPT

## 2024-07-29 PROCEDURE — 84443 ASSAY THYROID STIM HORMONE: CPT

## 2024-07-29 PROCEDURE — 85025 COMPLETE CBC W/AUTO DIFF WBC: CPT

## 2024-07-29 PROCEDURE — 80053 COMPREHEN METABOLIC PANEL: CPT

## 2024-07-29 PROCEDURE — 81003 URINALYSIS AUTO W/O SCOPE: CPT

## 2024-07-29 PROCEDURE — 36415 COLL VENOUS BLD VENIPUNCTURE: CPT

## 2024-07-29 PROCEDURE — 80061 LIPID PANEL: CPT

## 2024-07-29 PROCEDURE — 82570 ASSAY OF URINE CREATININE: CPT

## 2024-07-29 PROCEDURE — 83036 HEMOGLOBIN GLYCOSYLATED A1C: CPT

## 2024-08-01 ENCOUNTER — TELEPHONE (OUTPATIENT)
Dept: FAMILY MEDICINE CLINIC | Facility: CLINIC | Age: 64
End: 2024-08-01

## 2024-08-01 ENCOUNTER — OFFICE VISIT (OUTPATIENT)
Dept: FAMILY MEDICINE CLINIC | Facility: CLINIC | Age: 64
End: 2024-08-01
Payer: COMMERCIAL

## 2024-08-01 VITALS
WEIGHT: 217.8 LBS | SYSTOLIC BLOOD PRESSURE: 138 MMHG | DIASTOLIC BLOOD PRESSURE: 90 MMHG | HEART RATE: 79 BPM | TEMPERATURE: 97.2 F | BODY MASS INDEX: 28.86 KG/M2 | HEIGHT: 73 IN | OXYGEN SATURATION: 97 %

## 2024-08-01 DIAGNOSIS — M51.36 DDD (DEGENERATIVE DISC DISEASE), LUMBAR: ICD-10-CM

## 2024-08-01 DIAGNOSIS — E11.65 UNCONTROLLED TYPE 2 DIABETES MELLITUS WITH HYPERGLYCEMIA: Primary | ICD-10-CM

## 2024-08-01 DIAGNOSIS — Z91.199 NON-COMPLIANCE: ICD-10-CM

## 2024-08-01 DIAGNOSIS — M19.90 ARTHRITIS: ICD-10-CM

## 2024-08-01 DIAGNOSIS — J01.40 ACUTE NON-RECURRENT PANSINUSITIS: ICD-10-CM

## 2024-08-01 PROCEDURE — 1160F RVW MEDS BY RX/DR IN RCRD: CPT

## 2024-08-01 PROCEDURE — 1159F MED LIST DOCD IN RCRD: CPT

## 2024-08-01 PROCEDURE — 99214 OFFICE O/P EST MOD 30 MIN: CPT

## 2024-08-01 PROCEDURE — 1125F AMNT PAIN NOTED PAIN PRSNT: CPT

## 2024-08-01 PROCEDURE — 3046F HEMOGLOBIN A1C LEVEL >9.0%: CPT

## 2024-08-01 RX ORDER — AZITHROMYCIN 250 MG/1
TABLET, FILM COATED ORAL
Qty: 6 TABLET | Refills: 0 | Status: SHIPPED | OUTPATIENT
Start: 2024-08-01

## 2024-08-01 NOTE — ASSESSMENT & PLAN NOTE
Not controlled at all, at this point significantly worsened and discussed with patient about the long-term effects of having uncontrolled blood sugar and other organ damage that can result from that.  Do not have a choice, have to place patient on insulin.  Discussed with him this insulin, discussed with him how to adjust dosage, and will refer him to endocrine for further management as well.  Patient to continue and consistently take his metformin and glipizide as prescribed.  Discussed the importance of this.  Adjusting diet is also significantly important as well.

## 2024-09-24 ENCOUNTER — OFFICE VISIT (OUTPATIENT)
Dept: ORTHOPEDIC SURGERY | Facility: CLINIC | Age: 64
End: 2024-09-24
Payer: COMMERCIAL

## 2024-09-24 VITALS
HEIGHT: 73 IN | DIASTOLIC BLOOD PRESSURE: 83 MMHG | BODY MASS INDEX: 28.76 KG/M2 | SYSTOLIC BLOOD PRESSURE: 125 MMHG | HEART RATE: 80 BPM | WEIGHT: 217 LBS | OXYGEN SATURATION: 96 %

## 2024-09-24 DIAGNOSIS — M17.0 BILATERAL PRIMARY OSTEOARTHRITIS OF KNEE: Primary | ICD-10-CM

## 2024-09-24 PROCEDURE — 20610 DRAIN/INJ JOINT/BURSA W/O US: CPT

## 2024-09-24 PROCEDURE — 1159F MED LIST DOCD IN RCRD: CPT

## 2024-09-24 PROCEDURE — 1160F RVW MEDS BY RX/DR IN RCRD: CPT

## 2024-09-24 RX ORDER — LIDOCAINE HYDROCHLORIDE 10 MG/ML
5 INJECTION, SOLUTION INFILTRATION; PERINEURAL
Status: COMPLETED | OUTPATIENT
Start: 2024-09-24 | End: 2024-09-24

## 2024-09-24 RX ORDER — TRIAMCINOLONE ACETONIDE 40 MG/ML
40 INJECTION, SUSPENSION INTRA-ARTICULAR; INTRAMUSCULAR
Status: COMPLETED | OUTPATIENT
Start: 2024-09-24 | End: 2024-09-24

## 2024-09-24 RX ADMIN — TRIAMCINOLONE ACETONIDE 40 MG: 40 INJECTION, SUSPENSION INTRA-ARTICULAR; INTRAMUSCULAR at 13:36

## 2024-09-24 RX ADMIN — TRIAMCINOLONE ACETONIDE 40 MG: 40 INJECTION, SUSPENSION INTRA-ARTICULAR; INTRAMUSCULAR at 13:35

## 2024-09-24 RX ADMIN — LIDOCAINE HYDROCHLORIDE 5 ML: 10 INJECTION, SOLUTION INFILTRATION; PERINEURAL at 13:35

## 2024-09-24 RX ADMIN — LIDOCAINE HYDROCHLORIDE 5 ML: 10 INJECTION, SOLUTION INFILTRATION; PERINEURAL at 13:36

## 2024-10-17 ENCOUNTER — TELEPHONE (OUTPATIENT)
Dept: ORTHOPEDIC SURGERY | Facility: CLINIC | Age: 64
End: 2024-10-17
Payer: COMMERCIAL

## 2024-10-17 NOTE — TELEPHONE ENCOUNTER
CALLED PT, PER RECORDING, DOES NOT ACCEPT CALLS AT THIS TIME (10-17-24).    APPT MADE:  12-27 AT 10:45 AM ARTHUR KNEE ZILRETTA INJ WITH [facility]    LAST ARTHUR KNEE STEROID INJ:  9-24    PASSPORT=AUTH EXP 1-7-25

## 2024-10-17 NOTE — TELEPHONE ENCOUNTER
----- Message from Eve BARNEY sent at 10/10/2024  9:16 AM EDT -----  Patient has been approved for bilateral knee Zilretta for dates:  10/07/24 to 01/07/25.  Okay to schedule when appropriate.  Auth #:  [id number]

## 2024-12-27 ENCOUNTER — OFFICE VISIT (OUTPATIENT)
Dept: ORTHOPEDIC SURGERY | Facility: CLINIC | Age: 64
End: 2024-12-27
Payer: COMMERCIAL

## 2024-12-27 VITALS
SYSTOLIC BLOOD PRESSURE: 129 MMHG | OXYGEN SATURATION: 96 % | DIASTOLIC BLOOD PRESSURE: 68 MMHG | HEART RATE: 75 BPM | WEIGHT: 216.93 LBS | BODY MASS INDEX: 28.75 KG/M2 | HEIGHT: 73 IN

## 2024-12-27 DIAGNOSIS — M25.561 CHRONIC PAIN OF BOTH KNEES: ICD-10-CM

## 2024-12-27 DIAGNOSIS — M17.0 BILATERAL PRIMARY OSTEOARTHRITIS OF KNEE: Primary | ICD-10-CM

## 2024-12-27 DIAGNOSIS — G89.29 CHRONIC PAIN OF BOTH KNEES: ICD-10-CM

## 2024-12-27 DIAGNOSIS — M25.562 CHRONIC PAIN OF BOTH KNEES: ICD-10-CM

## 2024-12-27 RX ORDER — LIDOCAINE HYDROCHLORIDE 10 MG/ML
5 INJECTION, SOLUTION INFILTRATION; PERINEURAL
Status: COMPLETED | OUTPATIENT
Start: 2024-12-27 | End: 2024-12-27

## 2024-12-27 RX ADMIN — LIDOCAINE HYDROCHLORIDE 5 ML: 10 INJECTION, SOLUTION INFILTRATION; PERINEURAL at 10:28

## 2024-12-27 RX ADMIN — LIDOCAINE HYDROCHLORIDE 5 ML: 10 INJECTION, SOLUTION INFILTRATION; PERINEURAL at 10:29

## 2024-12-27 NOTE — PROGRESS NOTES
"Chief Complaint  Pain and Follow-up of the Left Knee and Pain and Follow-up of the Right Knee    Subjective      Osmani Bucio presents to [facility] for his bilateral knees.  Patient has bilateral knee pain osteoarthritis that he has been managing conservatively with intermittent injections.  Patient arrives today stating that he has not had any falls or injuries since his last office visit.  He states that his knees are not doing well but he is not ready to consider surgical intervention.  He states that his right knee is more painful than his left currently.  He would like to proceed with bilateral knee steroid injections today in office.    Objective   Allergies   Allergen Reactions    Codeine Other (See Comments) and Palpitations     Fast heart rate    Other reaction(s): heart palpatations, profuse sweating    Morphine Nausea And Vomiting       Vital Signs:   /68   Pulse 75   Ht 185.4 cm (73\")   Wt 98.4 kg (216 lb 14.9 oz)   SpO2 96%   BMI 28.62 kg/m²       Physical Exam    Constitutional: Awake, alert. Well nourished appearance.    Integumentary: Warm, dry, intact. No obvious rashes.    HENT: Atraumatic, normocephalic.   Respiratory: Non labored respirations .   Cardiovascular: Intact peripheral pulses.    Psychiatric: Normal mood and affect. A&O X3    Ortho Exam  General: Alert, no acute distress.   Bilateral knee: Knee stable to varus/valgus stress.  Knee extensor mechanism intact.  0 degrees knee extension. Flexion to 115 degrees. Calf soft, non-tender.  Sensation and neurovascularly intact.  Demonstrates active ankle dorsiflexion and plantarflexion.  Palpable pedal pulses.            Imaging Results (Most Recent)       None             Large Joint Arthrocentesis: R knee  Date/Time: 12/27/2024 10:28 AM  Consent given by: patient  Site marked: site marked  Timeout: Immediately prior to procedure a time out was called to verify the correct patient, procedure, " equipment, support staff and site/side marked as required   Supporting Documentation  Indications: pain   Procedure Details  Location: knee - R knee  Preparation: Patient was prepped and draped in the usual sterile fashion  Needle gauge: 21 G.  Approach: lateral  Medications administered: 5 mL lidocaine 1 %; 32 mg Triamcinolone Acetonide 32 MG  Patient tolerance: patient tolerated the procedure well with no immediate complications      Large Joint Arthrocentesis: L knee  Date/Time: 12/27/2024 10:29 AM  Consent given by: patient  Site marked: site marked  Timeout: Immediately prior to procedure a time out was called to verify the correct patient, procedure, equipment, support staff and site/side marked as required   Supporting Documentation  Indications: pain   Procedure Details  Location: knee - L knee  Preparation: Patient was prepped and draped in the usual sterile fashion  Needle gauge: 21 G.  Approach: lateral  Medications administered: 5 mL lidocaine 1 %; 32 mg Triamcinolone Acetonide 32 MG  Patient tolerance: patient tolerated the procedure well with no immediate complications         This injection documentation was Scribed for MARGARITA Willis by Macie Benavides.  12/27/24   10:29 EST       Assessment and Plan   Problem List Items Addressed This Visit    None  Visit Diagnoses       Bilateral primary osteoarthritis of knee    -  Primary    Chronic pain of both knees              Osmani Bucio presents today to [facility] for the follow up of their bilateral knees. They have bilateral knee pain and osteoarthritis that we have been treating conservatively with intermittent injections.     We discussed the risks, benefits and alternatives of injections. Patient was informed of possible adverse effects including but not limited to bleeding, damage to nerve, tendon or artery, increased blood sugar and increased blood pressure. Discussed possibility of a reaction from the injection.  Discussed the  possibility that the injection may not completely improve or remove the pain.  Discussed the risk of infection.  Discussed the possibility of worsening pain after the injection.  Informed consent obtained.  Time out was performed. Patient was placed with knee in flexion at 90 degrees. Site marked inferior and lateral to patella.  Chlorhexidine was swabbed at injection site per typical technique. Needle injected into bursa, aspiration was performed and then bilateral knee Zilretta slowly injected into joint space, fluid was free flowing. Needle was removed, band-aid placed to injection site.  Patient tolerated injection well with no complications.     Follow up in 3 months for repeat steroid injections.    Follow Up   Return in about 3 months (around 3/27/2025).    Tobacco Use: Low Risk  (9/24/2024)    Patient History     Smoking Tobacco Use: Never     Smokeless Tobacco Use: Never     Passive Exposure: Not on file       Educated on risk of smoking. Discussed options for smoking cessation.    There are no Patient Instructions on file for this visit.  Patient was given instructions and counseling regarding his condition or for health maintenance advice. Please see specific information pulled into the AVS if appropriate.

## 2025-01-16 DIAGNOSIS — M51.369 DDD (DEGENERATIVE DISC DISEASE), LUMBAR: ICD-10-CM

## 2025-01-16 DIAGNOSIS — G62.9 POLYNEUROPATHY: ICD-10-CM

## 2025-01-16 DIAGNOSIS — K21.9 GASTROESOPHAGEAL REFLUX DISEASE WITHOUT ESOPHAGITIS: ICD-10-CM

## 2025-01-16 DIAGNOSIS — M19.90 ARTHRITIS: ICD-10-CM

## 2025-01-16 RX ORDER — PANTOPRAZOLE SODIUM 40 MG/1
40 TABLET, DELAYED RELEASE ORAL DAILY
Qty: 90 TABLET | Refills: 1 | Status: SHIPPED | OUTPATIENT
Start: 2025-01-16

## 2025-01-16 RX ORDER — TRAMADOL HYDROCHLORIDE 50 MG/1
50 TABLET ORAL EVERY 8 HOURS PRN
Qty: 90 TABLET | Refills: 1 | Status: SHIPPED | OUTPATIENT
Start: 2025-01-16

## 2025-01-16 RX ORDER — GABAPENTIN 300 MG/1
300 CAPSULE ORAL 3 TIMES DAILY
Qty: 90 CAPSULE | Refills: 2 | Status: SHIPPED | OUTPATIENT
Start: 2025-01-16

## 2025-01-16 NOTE — TELEPHONE ENCOUNTER
Caller: Osmani Bucio    Relationship: Self    Best call back number: [phone]     Requested Prescriptions:   Requested Prescriptions     Pending Prescriptions Disp Refills    gabapentin (NEURONTIN) 300 MG capsule 90 capsule 2     Sig: Take 1 capsule by mouth 3 (Three) Times a Day.    pantoprazole (PROTONIX) 40 MG EC tablet 90 tablet 1     Sig: Take 1 tablet by mouth Daily.        Pharmacy where request should be sent: [address] [phone] [facility] [phone]      Last office visit with prescribing clinician: 8/1/2024   Last telemedicine visit with prescribing clinician: Visit date not found   Next office visit with prescribing clinician: 1/30/2025     Does the patient have less than a 3 day supply:  [x] Yes  [] No    Would you like a call back once the refill request has been completed: [x] Yes [] No    If the office needs to give you a call back, can they leave a voicemail: [x] Yes [] No    Iker Gomez Rep   01/16/25 14:32 EST

## 2025-01-16 NOTE — TELEPHONE ENCOUNTER
UPCOMING APPTS  With Family Medicine (MARGARITA Aponte)  01/30/2025 at 1:15 PM  LAST OFFICE VISIT - THIS DEPT  8/1/2024 Afua Varner APRN    Uds 5/7/2024

## 2025-01-30 ENCOUNTER — OFFICE VISIT (OUTPATIENT)
Dept: FAMILY MEDICINE CLINIC | Facility: CLINIC | Age: 65
End: 2025-01-30
Payer: COMMERCIAL

## 2025-01-30 VITALS
OXYGEN SATURATION: 98 % | HEIGHT: 73 IN | SYSTOLIC BLOOD PRESSURE: 138 MMHG | DIASTOLIC BLOOD PRESSURE: 88 MMHG | WEIGHT: 221.1 LBS | BODY MASS INDEX: 29.3 KG/M2 | TEMPERATURE: 98.1 F | HEART RATE: 73 BPM

## 2025-01-30 DIAGNOSIS — E11.65 UNCONTROLLED TYPE 2 DIABETES MELLITUS WITH HYPERGLYCEMIA: ICD-10-CM

## 2025-01-30 DIAGNOSIS — M17.11 PRIMARY OSTEOARTHRITIS OF RIGHT KNEE: ICD-10-CM

## 2025-01-30 DIAGNOSIS — E78.2 MIXED HYPERLIPIDEMIA: ICD-10-CM

## 2025-01-30 DIAGNOSIS — K21.00 GASTROESOPHAGEAL REFLUX DISEASE WITH ESOPHAGITIS WITHOUT HEMORRHAGE: ICD-10-CM

## 2025-01-30 DIAGNOSIS — Z13.220 SCREENING FOR LIPID DISORDERS: ICD-10-CM

## 2025-01-30 DIAGNOSIS — M19.90 ARTHRITIS: ICD-10-CM

## 2025-01-30 DIAGNOSIS — G62.9 POLYNEUROPATHY: Primary | ICD-10-CM

## 2025-01-30 DIAGNOSIS — R13.19 ESOPHAGEAL DYSPHAGIA: ICD-10-CM

## 2025-01-30 DIAGNOSIS — R07.9 CHEST PAIN, UNSPECIFIED TYPE: ICD-10-CM

## 2025-01-30 DIAGNOSIS — M51.369 DEGENERATION OF INTERVERTEBRAL DISC OF LUMBAR REGION WITHOUT DISCOGENIC BACK PAIN OR LOWER EXTREMITY PAIN: ICD-10-CM

## 2025-01-30 DIAGNOSIS — Z87.19 HISTORY OF HIATAL HERNIA: ICD-10-CM

## 2025-01-30 PROCEDURE — 1159F MED LIST DOCD IN RCRD: CPT

## 2025-01-30 PROCEDURE — 1160F RVW MEDS BY RX/DR IN RCRD: CPT

## 2025-01-30 PROCEDURE — 93000 ELECTROCARDIOGRAM COMPLETE: CPT

## 2025-01-30 PROCEDURE — 99214 OFFICE O/P EST MOD 30 MIN: CPT

## 2025-01-30 PROCEDURE — 1125F AMNT PAIN NOTED PAIN PRSNT: CPT

## 2025-01-30 RX ORDER — GABAPENTIN 600 MG/1
600 TABLET ORAL 3 TIMES DAILY
Qty: 90 TABLET | Refills: 2 | Status: SHIPPED | OUTPATIENT
Start: 2025-01-30

## 2025-01-30 RX ORDER — MELOXICAM 7.5 MG/1
7.5 TABLET ORAL DAILY
Qty: 90 TABLET | Refills: 1 | Status: SHIPPED | OUTPATIENT
Start: 2025-01-30

## 2025-01-30 RX ORDER — METFORMIN HYDROCHLORIDE 500 MG/1
500 TABLET, EXTENDED RELEASE ORAL
Qty: 90 TABLET | Refills: 1 | Status: SHIPPED | OUTPATIENT
Start: 2025-01-30

## 2025-01-30 RX ORDER — GLIPIZIDE 5 MG/1
5 TABLET ORAL
Qty: 180 TABLET | Refills: 1 | Status: SHIPPED | OUTPATIENT
Start: 2025-01-30

## 2025-01-30 RX ORDER — ROSUVASTATIN CALCIUM 5 MG/1
5 TABLET, COATED ORAL DAILY
Qty: 90 TABLET | Refills: 1 | Status: SHIPPED | OUTPATIENT
Start: 2025-01-30

## 2025-01-30 NOTE — ASSESSMENT & PLAN NOTE
Patient noncompliant.  Did discuss extensively about the risks of not controlling diabetes appropriately and all of the multi systems that it can be affected in the body.  Do recommend patient go back on his metformin 500 mg daily, stay on his Lantus 25 units daily, and glipizide 5 mg twice a day.  Did provide patient a list of medications and what they are for and why he is taking them.  Will also obtain a full panel of blood work as well.  Based off results may adjust medications if needed.  Strict change in lifestyle modifications as needed.

## 2025-01-30 NOTE — PROGRESS NOTES
Osmani Bucio presents to [facility] with complaints of difficulty swallowing, worsening heartburn/acid reflux, right-sided chest pain, and is here for follow-up.      History of Present Illness  This is a 64-year-old male who presents to the clinic with complaints of difficulty swallowing, worsening heartburn/acid reflux, right-sided chest pain, and is here for follow-up.    Diabetes mellitus type 2: Patient has been noncompliant and has not been taking his medications as prescribed.  States that he is going to make a better effort of this, try to be more consistent, and is due for blood work.  He does still have prescriptions for metformin, glipizide, and long-acting insulin.  He is currently on 25 units of Lantus.  States that he knows he needs to get better control over this because ultimately he does need his knees replaced and they will not replace his knees until he gets his diabetes better control.  He does endorse a lot of worsening neuropathy pain in both of his lower legs and is wondering if associates change could be made to his gabapentin.      Arthritis: This is a worsening issue for patient and states that he is getting worse.  States that he is have a lot of pain in his lower back, is got a lot of pain in both of his knees, discussed a lot of pain in his shoulders.  Knows that there is not a lot that he can do, has been following up with Ortho in regards to his knee pain and overall is doing okay, but is wondering about a more long-term solution.  States that he was never able to get the tramadol that was prescribed, was wondering if he could try to get that prescription again as he just would take it only when he severely needs it.  He does also no longer have the meloxicam that was previously prescribed either.    Patient states that his GERD is getting a lot worse.  Has been diagnosed with hiatal hernia at least 15+ years ago and remembers the moment whenever  "it started.  He is also noticed recently that if the pill is too big he cannot hardly swallow it.  He has been having some right sided chest pain that he thinks is related to his acid reflux and at times when he goes to lay down at night he will have a coughing fit but feel a lot of acid in the back of his throat.  States that he constantly feels like even with drinking water that the acid is just not improving.  He does take his Protonix every day.  Has not had a scope previously but would be willing to do so.  Has not had any vomiting.  No change in bowel movements.  No other GI upset.    Patient is due for full panel blood work which we will order today.    The following portions of the patient's history were personally reviewed and updated as appropriate: allergies, current medications, past medical history, past surgical history, past family history, and past social history.       Objective   Vital Signs:   /88 (BP Location: Left arm, Patient Position: Sitting, Cuff Size: Adult)   Pulse 73   Temp 98.1 °F (36.7 °C)   Ht 185.4 cm (72.99\")   Wt 100 kg (221 lb 1.6 oz)   SpO2 98%   BMI 29.18 kg/m²     Body mass index is 29.18 kg/m².    All labs, imaging, test results, and specialty provider notes reviewed with patient.     Physical Exam  Vitals reviewed.   Constitutional:       Appearance: Normal appearance.   Cardiovascular:      Rate and Rhythm: Normal rate and regular rhythm.      Pulses: Normal pulses.      Heart sounds: Normal heart sounds.   Pulmonary:      Effort: Pulmonary effort is normal.      Breath sounds: Normal breath sounds.   Neurological:      General: No focal deficit present.      Mental Status: He is alert and oriented to person, place, and time.              ECG 12 Lead    Date/Time: 1/30/2025 2:27 PM  Performed by: Afua Varner APRN    Authorized by: Afua Varner APRN  Previous ECG: no previous ECG available  Rhythm: sinus rhythm    Clinical impression: normal " ECG                        Assessment and Plan:  Diagnoses and all orders for this visit:    1. Polyneuropathy (Primary)  Comments:  Likely due to worsening diabetes control, discussed this extensively.  Will increase gabapentin to 600 mg.  UDS/consent up-to-date Nikolai reviewed.  Orders:  -     gabapentin (NEURONTIN) 600 MG tablet; Take 1 tablet by mouth 3 (Three) Times a Day.  Dispense: 90 tablet; Refill: 2    2. Uncontrolled type 2 diabetes mellitus with hyperglycemia  Assessment & Plan:  Patient noncompliant.  Did discuss extensively about the risks of not controlling diabetes appropriately and all of the multi systems that it can be affected in the body.  Do recommend patient go back on his metformin 500 mg daily, stay on his Lantus 25 units daily, and glipizide 5 mg twice a day.  Did provide patient a list of medications and what they are for and why he is taking them.  Will also obtain a full panel of blood work as well.  Based off results may adjust medications if needed.  Strict change in lifestyle modifications as needed.      Orders:  -     metFORMIN ER (GLUCOPHAGE-XR) 500 MG 24 hr tablet; Take 1 tablet by mouth Daily With Breakfast.  Dispense: 90 tablet; Refill: 1  -     Insulin Glargine (LANTUS SOLOSTAR) 100 UNIT/ML injection pen; Inject 10-40 Units under the skin into the appropriate area as directed Daily. Increase by 3 units every 3 days until fasting blood sugar less than 250 or reaches 40 units  Dispense: 45 mL; Refill: 1  -     glipizide (Glucotrol) 5 MG tablet; Take 1 tablet by mouth 2 (Two) Times a Day Before Meals.  Dispense: 180 tablet; Refill: 1  -     CBC Auto Differential; Future  -     Comprehensive Metabolic Panel; Future  -     TSH Rfx On Abnormal To Free T4; Future  -     Urinalysis With Culture If Indicated -; Future  -     Hemoglobin A1c; Future  -     Microalbumin / Creatinine Urine Ratio - Urine, Clean Catch; Future    3. Degeneration of intervertebral disc of lumbar region without  discogenic back pain or lower extremity pain    4. Arthritis  Comments:  Will continue patient on tramadol, discussed risk/side effects of this medication, use only as needed.  UDS/consent up-to-date Nikolai reviewed.  Orders:  -     meloxicam (MOBIC) 7.5 MG tablet; Take 1 tablet by mouth Daily.  Dispense: 90 tablet; Refill: 1    5. Gastroesophageal reflux disease with esophagitis without hemorrhage  -     Ambulatory Referral to Gastroenterology    6. Esophageal dysphagia  Comments:  Likely worsening acid reflux, no longer controlled by pantoprazole, sent to gastro for EGD and other evaluation.    7. History of hiatal hernia    8. Primary osteoarthritis of right knee  -     meloxicam (MOBIC) 7.5 MG tablet; Take 1 tablet by mouth Daily.  Dispense: 90 tablet; Refill: 1    9. Mixed hyperlipidemia  -     rosuvastatin (CRESTOR) 5 MG tablet; Take 1 tablet by mouth Daily.  Dispense: 90 tablet; Refill: 1    10. Screening for lipid disorders  -     Lipid Panel; Future    Other orders  -     ECG 12 Lead          Follow Up:  No follow-ups on file.    Patient was given instructions and counseling regarding his condition or for health maintenance advice. Please see specific information pulled into the AVS if appropriate.

## 2025-02-21 ENCOUNTER — PREP FOR SURGERY (OUTPATIENT)
Dept: OTHER | Facility: HOSPITAL | Age: 65
End: 2025-02-21
Payer: COMMERCIAL

## 2025-02-21 ENCOUNTER — CLINICAL SUPPORT (OUTPATIENT)
Dept: GASTROENTEROLOGY | Facility: CLINIC | Age: 65
End: 2025-02-21
Payer: COMMERCIAL

## 2025-02-21 DIAGNOSIS — K21.9 GASTROESOPHAGEAL REFLUX DISEASE, UNSPECIFIED WHETHER ESOPHAGITIS PRESENT: Primary | ICD-10-CM

## 2025-02-21 NOTE — PROGRESS NOTES
Osmani Bucio  1960  64 y.o.    Reason for call: GERD- REFERRED BY BASILIO ROCHA HX OF COLON CANCER  Prep prescribed: N/A  Prep instructions reviewed with patient and sent to patient via regular mail to the home address on file  Is the patient currently on any injectable or oral medications for weight loss or diabetes? No  Clearance needed? No  If yes, what clearance is needed? N/A  Clearance has been requested from N/A  The patient has been scheduled for: EGD    After your procedure, you will be contacted with results. Please confirm the best phone # to reach the patient: [phone]  Family history of colon cancer? Yes  If yes, indicate relative: FATHER  Tentative Procedure Date: 3/13/2025    Family History   Problem Relation Age of Onset    Diabetes Mother     Osteoporosis Mother     Arthritis Mother     Colon cancer Father     Heart disease Father     Cancer Father     Arthritis Father     Heart disease Sister     Osteoporosis Sister     Osteoporosis Brother     Malig Hyperthermia Neg Hx      Past Medical History:   Diagnosis Date    Acid reflux     Arthritis     Cervical pain (neck)     Chronic bilateral thoracic back pain     L5 FUSED    Diabetes     Dyspnea on exertion     SINCE COVID 1/2021 ON EXERTION ONLY    Limb swelling     OCCASSIONAL R ANKLE     Allergies   Allergen Reactions    Codeine Other (See Comments) and Palpitations     Fast heart rate    Other reaction(s): heart palpatations, profuse sweating    Morphine Nausea And Vomiting     Past Surgical History:   Procedure Laterality Date    APPENDECTOMY      BACK SURGERY      L5 FUSION    COLONOSCOPY      KNEE ARTHROSCOPY Right     MENISCUS REPAIR     Social History     Socioeconomic History    Marital status:    Tobacco Use    Smoking status: Never     Passive exposure: Past    Smokeless tobacco: Never   Vaping Use    Vaping status: Never Used   Substance and Sexual Activity    Alcohol use: Never    Drug use: Never     Sexual activity: Defer       Current Outpatient Medications:     gabapentin (NEURONTIN) 600 MG tablet, Take 1 tablet by mouth 3 (Three) Times a Day., Disp: 90 tablet, Rfl: 2    glipizide (Glucotrol) 5 MG tablet, Take 1 tablet by mouth 2 (Two) Times a Day Before Meals., Disp: 180 tablet, Rfl: 1    Ibuprofen 3 %, Gabapentin 10 %, Baclofen 2 %, lidocaine 4 %, Apply 1-2 g topically to the appropriate area as directed 3 (Three) to 4 (Four) times daily., Disp: 90 g, Rfl: 2    Insulin Glargine (LANTUS SOLOSTAR) 100 UNIT/ML injection pen, Inject 10-40 Units under the skin into the appropriate area as directed Daily. Increase by 3 units every 3 days until fasting blood sugar less than 250 or reaches 40 units, Disp: 45 mL, Rfl: 1    meloxicam (MOBIC) 7.5 MG tablet, Take 1 tablet by mouth Daily., Disp: 90 tablet, Rfl: 1    metFORMIN ER (GLUCOPHAGE-XR) 500 MG 24 hr tablet, Take 1 tablet by mouth Daily With Breakfast., Disp: 90 tablet, Rfl: 1    methocarbamol (ROBAXIN) 750 MG tablet, Take 1 tablet by mouth 3 (Three) Times a Day As Needed for Muscle Spasms., Disp: 90 tablet, Rfl: 3    pantoprazole (PROTONIX) 40 MG EC tablet, Take 1 tablet by mouth Daily., Disp: 90 tablet, Rfl: 1    rosuvastatin (CRESTOR) 5 MG tablet, Take 1 tablet by mouth Daily., Disp: 90 tablet, Rfl: 1    traMADol (ULTRAM) 50 MG tablet, Take 1 tablet by mouth Every 8 (Eight) Hours As Needed for Moderate Pain., Disp: 90 tablet, Rfl: 1    Current Facility-Administered Medications:     methylPREDNISolone acetate (DEPO-medrol) injection 80 mg, 80 mg, Intramuscular, Once, Afua Varner APRN

## 2025-03-07 NOTE — PRE-PROCEDURE INSTRUCTIONS
Reminded of arrival time at  1000, Entrance C of the main hospital. Instructed to bring picture ID and Insurance Card. Have a  over the age of 18 to drive you home the day of procedure.     Nothing by mouth after midnight the night before the procedure or the AM of the procedure.    Meds to take AM of Procedure-  no am meds    No metformin after 6 pm the night before

## 2025-03-12 ENCOUNTER — ANESTHESIA EVENT (OUTPATIENT)
Dept: GASTROENTEROLOGY | Facility: HOSPITAL | Age: 65
End: 2025-03-12
Payer: COMMERCIAL

## 2025-03-12 NOTE — ANESTHESIA PREPROCEDURE EVALUATION
Anesthesia Evaluation     Patient summary reviewed and Nursing notes reviewed   NPO Solid Status: > 8 hours  NPO Liquid Status: > 2 hours           Airway   Mallampati: II  TM distance: >3 FB  Neck ROM: full  No difficulty expected  Dental    (+) poor dentition        Pulmonary - normal exam   (+) ,shortness of breath  Cardiovascular - normal exam  Exercise tolerance: good (4-7 METS)    ECG reviewed        Neuro/Psych  (+) numbness  GI/Hepatic/Renal/Endo    (+) GERD, diabetes mellitus type 2 poorly controlled    Musculoskeletal     (+) neck pain  Abdominal  - normal exam    Bowel sounds: normal.   Substance History      OB/GYN          Other   arthritis,     ROS/Med Hx Other: ECHO 2021: IMPRESSION:    1.  Hyperdynamic ejection fraction of 55%.    2.  Mild left ventricular hypertrophy.    4.  No significant valvular heart issues.                        Anesthesia Plan    ASA 3     general   total IV anesthesia  (Total IV Anesthesia    Patient understands anesthesia not responsible for dental damage.    Risks explained including allergic reactions, BP, HR, O2 changes, aspiration, advanced airway placement. Pt. Verbalizes understanding.   )  intravenous induction     Anesthetic plan, risks, benefits, and alternatives have been provided, discussed and informed consent has been obtained with: patient.  Pre-procedure education provided  Plan discussed with CRNA.        CODE STATUS:

## 2025-03-13 ENCOUNTER — ANESTHESIA (OUTPATIENT)
Dept: GASTROENTEROLOGY | Facility: HOSPITAL | Age: 65
End: 2025-03-13
Payer: COMMERCIAL

## 2025-03-13 ENCOUNTER — HOSPITAL ENCOUNTER (OUTPATIENT)
Facility: HOSPITAL | Age: 65
Setting detail: HOSPITAL OUTPATIENT SURGERY
Discharge: HOME | End: 2025-03-13
Attending: INTERNAL MEDICINE | Admitting: INTERNAL MEDICINE
Payer: COMMERCIAL

## 2025-03-13 VITALS
HEART RATE: 58 BPM | SYSTOLIC BLOOD PRESSURE: 129 MMHG | TEMPERATURE: 97 F | OXYGEN SATURATION: 97 % | DIASTOLIC BLOOD PRESSURE: 79 MMHG | RESPIRATION RATE: 17 BRPM | WEIGHT: 218.92 LBS | BODY MASS INDEX: 28.89 KG/M2

## 2025-03-13 DIAGNOSIS — K21.9 GASTROESOPHAGEAL REFLUX DISEASE, UNSPECIFIED WHETHER ESOPHAGITIS PRESENT: ICD-10-CM

## 2025-03-13 LAB
GLUCOSE BLDC GLUCOMTR-MCNC: 285 MG/DL (ref 70–99)
GLUCOSE BLDC GLUCOMTR-MCNC: 322 MG/DL (ref 70–99)

## 2025-03-13 PROCEDURE — 88305 TISSUE EXAM BY PATHOLOGIST: CPT | Performed by: INTERNAL MEDICINE

## 2025-03-13 PROCEDURE — 25010000002 PROPOFOL 10 MG/ML EMULSION: Performed by: NURSE ANESTHETIST, CERTIFIED REGISTERED

## 2025-03-13 PROCEDURE — 82948 REAGENT STRIP/BLOOD GLUCOSE: CPT

## 2025-03-13 PROCEDURE — 63710000001 INSULIN REGULAR HUMAN PER 5 UNITS

## 2025-03-13 PROCEDURE — 43239 EGD BIOPSY SINGLE/MULTIPLE: CPT | Performed by: INTERNAL MEDICINE

## 2025-03-13 PROCEDURE — 25010000002 LIDOCAINE PF 2% 2 % SOLUTION: Performed by: NURSE ANESTHETIST, CERTIFIED REGISTERED

## 2025-03-13 PROCEDURE — 25810000003 LACTATED RINGERS PER 1000 ML: Performed by: NURSE ANESTHETIST, CERTIFIED REGISTERED

## 2025-03-13 PROCEDURE — 25810000003 LACTATED RINGERS PER 1000 ML

## 2025-03-13 RX ORDER — PROPOFOL 10 MG/ML
VIAL (ML) INTRAVENOUS AS NEEDED
Status: DISCONTINUED | OUTPATIENT
Start: 2025-03-13 | End: 2025-03-13 | Stop reason: SURG

## 2025-03-13 RX ORDER — SODIUM CHLORIDE, SODIUM LACTATE, POTASSIUM CHLORIDE, CALCIUM CHLORIDE 600; 310; 30; 20 MG/100ML; MG/100ML; MG/100ML; MG/100ML
30 INJECTION, SOLUTION INTRAVENOUS CONTINUOUS
Status: DISCONTINUED | OUTPATIENT
Start: 2025-03-13 | End: 2025-03-13 | Stop reason: HOSPADM

## 2025-03-13 RX ORDER — SODIUM CHLORIDE, SODIUM LACTATE, POTASSIUM CHLORIDE, CALCIUM CHLORIDE 600; 310; 30; 20 MG/100ML; MG/100ML; MG/100ML; MG/100ML
INJECTION, SOLUTION INTRAVENOUS CONTINUOUS PRN
Status: DISCONTINUED | OUTPATIENT
Start: 2025-03-13 | End: 2025-03-13 | Stop reason: SURG

## 2025-03-13 RX ORDER — LIDOCAINE HYDROCHLORIDE 20 MG/ML
INJECTION, SOLUTION EPIDURAL; INFILTRATION; INTRACAUDAL; PERINEURAL AS NEEDED
Status: DISCONTINUED | OUTPATIENT
Start: 2025-03-13 | End: 2025-03-13 | Stop reason: SURG

## 2025-03-13 RX ADMIN — PROPOFOL 30 MG: 10 INJECTION, EMULSION INTRAVENOUS at 11:46

## 2025-03-13 RX ADMIN — SODIUM CHLORIDE, SODIUM LACTATE, POTASSIUM CHLORIDE, CALCIUM CHLORIDE 30 ML/HR: 20; 30; 600; 310 INJECTION, SOLUTION INTRAVENOUS at 11:41

## 2025-03-13 RX ADMIN — PROPOFOL 200 MCG/KG/MIN: 10 INJECTION, EMULSION INTRAVENOUS at 11:46

## 2025-03-13 RX ADMIN — INSULIN HUMAN 5 UNITS: 100 INJECTION, SOLUTION PARENTERAL at 11:41

## 2025-03-13 RX ADMIN — LIDOCAINE HYDROCHLORIDE 100 MG: 20 INJECTION, SOLUTION EPIDURAL; INFILTRATION; INTRACAUDAL; PERINEURAL at 11:46

## 2025-03-13 RX ADMIN — SODIUM CHLORIDE, POTASSIUM CHLORIDE, SODIUM LACTATE AND CALCIUM CHLORIDE: 600; 310; 30; 20 INJECTION, SOLUTION INTRAVENOUS at 11:46

## 2025-03-13 NOTE — H&P
Pre Procedure History & Physical    Chief Complaint:   gerd    Subjective     HPI:   gerd    Past Medical History:   Past Medical History:   Diagnosis Date    Acid reflux     Arthritis     Cervical pain (neck)     Chronic bilateral thoracic back pain     L5 FUSED    Diabetes     Dyspnea on exertion     SINCE COVID 1/2021 ON EXERTION ONLY    Limb swelling     OCCASSIONAL R ANKLE       Past Surgical History:  Past Surgical History:   Procedure Laterality Date    APPENDECTOMY      BACK SURGERY      L5 FUSION    COLONOSCOPY      KNEE ARTHROSCOPY Right     MENISCUS REPAIR       Family History:  Family History   Problem Relation Age of Onset    Diabetes Mother     Osteoporosis Mother     Arthritis Mother     Colon cancer Father     Heart disease Father     Cancer Father     Arthritis Father     Heart disease Sister     Osteoporosis Sister     Osteoporosis Brother     Malig Hyperthermia Neg Hx        Social History:   reports that he has never smoked. He has been exposed to tobacco smoke. He has never used smokeless tobacco. He reports that he does not drink alcohol and does not use drugs.    Medications:   Facility-Administered Medications Prior to Admission   Medication Dose Route Frequency Provider Last Rate Last Admin    methylPREDNISolone acetate (DEPO-medrol) injection 80 mg  80 mg Intramuscular Once Afua Varner APRN         Medications Prior to Admission   Medication Sig Dispense Refill Last Dose/Taking    gabapentin (NEURONTIN) 600 MG tablet Take 1 tablet by mouth 3 (Three) Times a Day. 90 tablet 2     glipizide (Glucotrol) 5 MG tablet Take 1 tablet by mouth 2 (Two) Times a Day Before Meals. 180 tablet 1     Ibuprofen 3 %, Gabapentin 10 %, Baclofen 2 %, lidocaine 4 % Apply 1-2 g topically to the appropriate area as directed 3 (Three) to 4 (Four) times daily. 90 g 2     Insulin Glargine (LANTUS SOLOSTAR) 100 UNIT/ML injection pen Inject 10-40 Units under the skin into the appropriate area as directed Daily.  Increase by 3 units every 3 days until fasting blood sugar less than 250 or reaches 40 units 45 mL 1     meloxicam (MOBIC) 7.5 MG tablet Take 1 tablet by mouth Daily. 90 tablet 1     metFORMIN ER (GLUCOPHAGE-XR) 500 MG 24 hr tablet Take 1 tablet by mouth Daily With Breakfast. 90 tablet 1     methocarbamol (ROBAXIN) 750 MG tablet Take 1 tablet by mouth 3 (Three) Times a Day As Needed for Muscle Spasms. 90 tablet 3     pantoprazole (PROTONIX) 40 MG EC tablet Take 1 tablet by mouth Daily. 90 tablet 1     rosuvastatin (CRESTOR) 5 MG tablet Take 1 tablet by mouth Daily. 90 tablet 1     traMADol (ULTRAM) 50 MG tablet Take 1 tablet by mouth Every 8 (Eight) Hours As Needed for Moderate Pain. 90 tablet 1        Allergies:  Codeine and Morphine        Objective     Weight 99.3 kg (218 lb 14.7 oz).    Physical Exam   Constitutional: Pt is oriented to person, place, and time and well-developed, well-nourished, and in no distress.   Mouth/Throat: Oropharynx is clear and moist.   Neck: Normal range of motion.   Cardiovascular: Normal rate, regular rhythm and normal heart sounds.    Pulmonary/Chest: Effort normal and breath sounds normal.   Abdominal: Soft. Nontender  Skin: Skin is warm and dry.   Psychiatric: Mood, memory, affect and judgment normal.     Assessment & Plan     Diagnosis:  gerd    Anticipated Surgical Procedure:  egd    The risks, benefits, and alternatives of this procedure have been discussed with the patient or the responsible party- the patient understands and agrees to proceed.

## 2025-03-13 NOTE — ANESTHESIA POSTPROCEDURE EVALUATION
Patient: Osmani Bucio    Procedure Summary       Date: 03/13/25 Room / Location: [facility] ENDOSCOPY 2 / [facility] ENDOSCOPY    Anesthesia Start: 1145 Anesthesia Stop: 1202    Procedure: ESOPHAGOGASTRODUODENOSCOPY WITH BIOPSIES Diagnosis:       Gastroesophageal reflux disease, unspecified whether esophagitis present      (Gastroesophageal reflux disease, unspecified whether esophagitis present [K21.9])    Surgeons: Elijah Willett MD Provider: Jemal Berg CRNA    Anesthesia Type: general ASA Status: 3            Anesthesia Type: general    Vitals  Vitals Value Taken Time   /79 03/13/25 12:26   Temp 36.1 °C (97 °F) 03/13/25 12:05   Pulse 71 03/13/25 12:27   Resp 17 03/13/25 12:25   SpO2 97 % 03/13/25 12:27   Vitals shown include unfiled device data.        Post Anesthesia Care and Evaluation    Post-procedure mental status: acceptable.  Pain management: satisfactory to patient    Airway patency: patent  Anesthetic complications: No anesthetic complications    Cardiovascular status: acceptable  Respiratory status: acceptable    Comments: Per chart review

## 2025-03-17 LAB
CYTO UR: NORMAL
LAB AP CASE REPORT: NORMAL
LAB AP CLINICAL INFORMATION: NORMAL
PATH REPORT.FINAL DX SPEC: NORMAL
PATH REPORT.GROSS SPEC: NORMAL

## 2025-04-15 DIAGNOSIS — M51.369 DDD (DEGENERATIVE DISC DISEASE), LUMBAR: ICD-10-CM

## 2025-04-15 DIAGNOSIS — M19.90 ARTHRITIS: ICD-10-CM

## 2025-04-16 ENCOUNTER — LAB (OUTPATIENT)
Dept: LAB | Facility: HOSPITAL | Age: 65
End: 2025-04-16
Payer: COMMERCIAL

## 2025-04-16 DIAGNOSIS — E11.65 UNCONTROLLED TYPE 2 DIABETES MELLITUS WITH HYPERGLYCEMIA: ICD-10-CM

## 2025-04-16 DIAGNOSIS — Z13.220 SCREENING FOR LIPID DISORDERS: ICD-10-CM

## 2025-04-16 LAB
ALBUMIN SERPL-MCNC: 4.3 G/DL (ref 3.5–5.2)
ALBUMIN/GLOB SERPL: 1.5 G/DL
ALP SERPL-CCNC: 64 U/L (ref 39–117)
ALT SERPL W P-5'-P-CCNC: 14 U/L (ref 1–41)
ANION GAP SERPL CALCULATED.3IONS-SCNC: 11.2 MMOL/L (ref 5–15)
AST SERPL-CCNC: 15 U/L (ref 1–40)
BACTERIA UR QL AUTO: NORMAL /HPF
BASOPHILS # BLD AUTO: 0.1 10*3/MM3 (ref 0–0.2)
BASOPHILS NFR BLD AUTO: 1.4 % (ref 0–1.5)
BILIRUB SERPL-MCNC: 0.8 MG/DL (ref 0–1.2)
BILIRUB UR QL STRIP: NEGATIVE
BUN SERPL-MCNC: 14 MG/DL (ref 8–23)
BUN/CREAT SERPL: 13.6 (ref 7–25)
CALCIUM SPEC-SCNC: 9.6 MG/DL (ref 8.6–10.5)
CHLORIDE SERPL-SCNC: 103 MMOL/L (ref 98–107)
CHOLEST SERPL-MCNC: 186 MG/DL (ref 0–200)
CLARITY UR: CLEAR
CO2 SERPL-SCNC: 24.8 MMOL/L (ref 22–29)
COLOR UR: YELLOW
CREAT SERPL-MCNC: 1.03 MG/DL (ref 0.76–1.27)
DEPRECATED RDW RBC AUTO: 39.6 FL (ref 37–54)
EGFRCR SERPLBLD CKD-EPI 2021: 82.1 ML/MIN/1.73
EOSINOPHIL # BLD AUTO: 0.25 10*3/MM3 (ref 0–0.4)
EOSINOPHIL NFR BLD AUTO: 3.4 % (ref 0.3–6.2)
ERYTHROCYTE [DISTWIDTH] IN BLOOD BY AUTOMATED COUNT: 12.4 % (ref 12.3–15.4)
GLOBULIN UR ELPH-MCNC: 2.9 GM/DL
GLUCOSE SERPL-MCNC: 335 MG/DL (ref 65–99)
GLUCOSE UR STRIP-MCNC: ABNORMAL MG/DL
HCT VFR BLD AUTO: 47.5 % (ref 37.5–51)
HDLC SERPL-MCNC: 27 MG/DL (ref 40–60)
HGB BLD-MCNC: 15.8 G/DL (ref 13–17.7)
HGB UR QL STRIP.AUTO: ABNORMAL
HOLD SPECIMEN: NORMAL
HYALINE CASTS UR QL AUTO: NORMAL /LPF
IMM GRANULOCYTES # BLD AUTO: 0.04 10*3/MM3 (ref 0–0.05)
IMM GRANULOCYTES NFR BLD AUTO: 0.5 % (ref 0–0.5)
KETONES UR QL STRIP: ABNORMAL
LDLC SERPL CALC-MCNC: 110 MG/DL (ref 0–100)
LDLC/HDLC SERPL: 3.8 {RATIO}
LEUKOCYTE ESTERASE UR QL STRIP.AUTO: NEGATIVE
LYMPHOCYTES # BLD AUTO: 2.51 10*3/MM3 (ref 0.7–3.1)
LYMPHOCYTES NFR BLD AUTO: 34.5 % (ref 19.6–45.3)
MCH RBC QN AUTO: 29.2 PG (ref 26.6–33)
MCHC RBC AUTO-ENTMCNC: 33.3 G/DL (ref 31.5–35.7)
MCV RBC AUTO: 87.6 FL (ref 79–97)
MONOCYTES # BLD AUTO: 0.51 10*3/MM3 (ref 0.1–0.9)
MONOCYTES NFR BLD AUTO: 7 % (ref 5–12)
NEUTROPHILS NFR BLD AUTO: 3.87 10*3/MM3 (ref 1.7–7)
NEUTROPHILS NFR BLD AUTO: 53.2 % (ref 42.7–76)
NITRITE UR QL STRIP: NEGATIVE
NRBC BLD AUTO-RTO: 0 /100 WBC (ref 0–0.2)
PH UR STRIP.AUTO: 5.5 [PH] (ref 5–8)
PLATELET # BLD AUTO: 268 10*3/MM3 (ref 140–450)
PMV BLD AUTO: 10.7 FL (ref 6–12)
POTASSIUM SERPL-SCNC: 4.6 MMOL/L (ref 3.5–5.2)
PROT SERPL-MCNC: 7.2 G/DL (ref 6–8.5)
PROT UR QL STRIP: NEGATIVE
RBC # BLD AUTO: 5.42 10*6/MM3 (ref 4.14–5.8)
RBC # UR STRIP: NORMAL /HPF
REF LAB TEST METHOD: NORMAL
SODIUM SERPL-SCNC: 139 MMOL/L (ref 136–145)
SP GR UR STRIP: >1.03 (ref 1–1.03)
SQUAMOUS #/AREA URNS HPF: NORMAL /HPF
TRIGL SERPL-MCNC: 282 MG/DL (ref 0–150)
TSH SERPL DL<=0.05 MIU/L-ACNC: 1.5 UIU/ML (ref 0.27–4.2)
UROBILINOGEN UR QL STRIP: ABNORMAL
VLDLC SERPL-MCNC: 49 MG/DL (ref 5–40)
WBC # UR STRIP: NORMAL /HPF
WBC NRBC COR # BLD AUTO: 7.28 10*3/MM3 (ref 3.4–10.8)

## 2025-04-16 PROCEDURE — 85025 COMPLETE CBC W/AUTO DIFF WBC: CPT

## 2025-04-16 PROCEDURE — 81001 URINALYSIS AUTO W/SCOPE: CPT

## 2025-04-16 PROCEDURE — 80061 LIPID PANEL: CPT

## 2025-04-16 PROCEDURE — 83036 HEMOGLOBIN GLYCOSYLATED A1C: CPT

## 2025-04-16 PROCEDURE — 80053 COMPREHEN METABOLIC PANEL: CPT

## 2025-04-16 PROCEDURE — 84443 ASSAY THYROID STIM HORMONE: CPT

## 2025-04-16 PROCEDURE — 82043 UR ALBUMIN QUANTITATIVE: CPT

## 2025-04-16 PROCEDURE — 82570 ASSAY OF URINE CREATININE: CPT

## 2025-04-16 PROCEDURE — 36415 COLL VENOUS BLD VENIPUNCTURE: CPT

## 2025-04-16 RX ORDER — TRAMADOL HYDROCHLORIDE 50 MG/1
50 TABLET ORAL EVERY 8 HOURS PRN
Qty: 30 TABLET | Refills: 1 | Status: SHIPPED | OUTPATIENT
Start: 2025-04-16

## 2025-04-16 NOTE — TELEPHONE ENCOUNTER
PA DENIED   The requested day supply exceeds plan limits for the selected drug or product. Please resubmit the claim for the day supply allowed by the plan

## 2025-04-17 LAB
ALBUMIN UR-MCNC: 1.7 MG/DL
CREAT UR-MCNC: 113.4 MG/DL
HBA1C MFR BLD: 13.4 % (ref 4.8–5.6)
MICROALBUMIN/CREAT UR: 15 MG/G (ref 0–29)

## 2025-04-22 ENCOUNTER — TELEPHONE (OUTPATIENT)
Dept: FAMILY MEDICINE CLINIC | Facility: CLINIC | Age: 65
End: 2025-04-22
Payer: COMMERCIAL

## 2025-04-22 NOTE — TELEPHONE ENCOUNTER
Caller: Osmani Bucio    Relationship to patient: Self    Best call back number: [phone]     Patient is needing: PATIENTS WIFE STATES THAT A PRIOR AUTHORIZATION IS NEEDED FOR THE PATIENTS PAIN MEDICATION

## 2025-05-23 ENCOUNTER — TELEPHONE (OUTPATIENT)
Dept: ORTHOPEDIC SURGERY | Facility: CLINIC | Age: 65
End: 2025-05-23
Payer: COMMERCIAL

## 2025-05-23 NOTE — TELEPHONE ENCOUNTER
Hub staff attempted to follow warm transfer process and was unsuccessful     Caller: ALLEN    Relationship to patient: WIFE    Best call back number: [phone]    Patient is needing: NEEDS TO SCHEDULE BILATERAL KNEE CORTISONE INJECTIONS- LAST APPT 12/27/24- DR PALMER PATIENT- PLEASE CALL

## 2025-05-30 ENCOUNTER — OFFICE VISIT (OUTPATIENT)
Dept: ORTHOPEDIC SURGERY | Facility: CLINIC | Age: 65
End: 2025-05-30
Payer: COMMERCIAL

## 2025-05-30 VITALS
SYSTOLIC BLOOD PRESSURE: 151 MMHG | BODY MASS INDEX: 28.96 KG/M2 | WEIGHT: 218.48 LBS | OXYGEN SATURATION: 96 % | HEIGHT: 73 IN | DIASTOLIC BLOOD PRESSURE: 87 MMHG | HEART RATE: 71 BPM

## 2025-05-30 DIAGNOSIS — M17.0 BILATERAL PRIMARY OSTEOARTHRITIS OF KNEE: Primary | ICD-10-CM

## 2025-05-30 DIAGNOSIS — M19.90 ARTHRITIS: ICD-10-CM

## 2025-05-30 DIAGNOSIS — M51.369 DDD (DEGENERATIVE DISC DISEASE), LUMBAR: ICD-10-CM

## 2025-05-30 RX ADMIN — LIDOCAINE HYDROCHLORIDE 5 ML: 10 INJECTION, SOLUTION INFILTRATION; PERINEURAL at 09:37

## 2025-05-30 RX ADMIN — TRIAMCINOLONE ACETONIDE 40 MG: 40 INJECTION, SUSPENSION INTRA-ARTICULAR; INTRAMUSCULAR at 09:37

## 2025-05-30 NOTE — TELEPHONE ENCOUNTER
Caller: ALLEN CABEZAS    Relationship: Emergency Contact    Best call back number:   Telephone Information:   Mobile [phone]        Requested Prescriptions:   Requested Prescriptions     Pending Prescriptions Disp Refills    traMADol (ULTRAM) 50 MG tablet 30 tablet 1     Sig: Take 1 tablet by mouth Every 8 (Eight) Hours As Needed for Moderate Pain.        Pharmacy where request should be sent: [facility] [address] [place] [address] [address] [facility] [phone] [facility] [phone] FX     Last office visit with prescribing clinician: 1/30/2025   Last telemedicine visit with prescribing clinician: Visit date not found   Next office visit with prescribing clinician: 6/17/2025         Does the patient have less than a 3 day supply:  [x] Yes  [] No        Summer Iker Bonner Rep   05/30/25 16:26 EDT

## 2025-05-30 NOTE — PROGRESS NOTES
"Chief Complaint  Pain and Follow-up of the Left Knee and Pain and Follow-up of the Right Knee    Subjective      Osmani Bucio presents to [facility]     History of Present Illness  Patient is here today following up on his bilateral knees.  He has bilateral knee pain and osteoarthritis that he has been trying to manage conservatively with intermittent injections.  He was last seen in office on 12/27/2024 where he received bilateral knee Zilretta injections.    They report persistent discomfort in their knees, with the left knee being more severely affected today. The pain is predominantly localized to the posterior aspect of the knee. They have not experienced any recent falls.  He would like to repeat bilateral knee steroid injections today in office.  He was made aware that we do not get authorization insurance approval for Zilretta at this time; it is still pending      Allergies   Allergen Reactions    Codeine Other (See Comments) and Palpitations     Fast heart rate    Other reaction(s): heart palpatations, profuse sweating    Morphine Nausea And Vomiting       Objective     Vital Signs:   Vitals:    05/30/25 0936   BP: 151/87   Pulse: 71   SpO2: 96%   Weight: 99.1 kg (218 lb 7.6 oz)   Height: 185.4 cm (73\")     Body mass index is 28.82 kg/m².    I reviewed the patient's chief complaint, history of present illness, review of systems, past medical history, surgical history, family history, social history, medications, and allergy list.     Ortho Exam    General: Alert, no acute distress.   Bilateral knee: No pain with passive hip ROM.  Knee stable to varus/valgus stress.  Knee extensor mechanism intact.  -5 degrees knee extension. Flexion to 115 degrees. Calf soft, non-tender.  Sensation and neurovascularly intact.  Demonstrates active ankle dorsiflexion and plantarflexion.  Palpable pedal pulses.             Large Joint Arthrocentesis: R knee  Date/Time: 5/30/2025 9:37 " AM  Consent given by: patient  Site marked: site marked  Timeout: Immediately prior to procedure a time out was called to verify the correct patient, procedure, equipment, support staff and site/side marked as required   Supporting Documentation  Indications: pain   Procedure Details  Location: knee - R knee  Preparation: Patient was prepped and draped in the usual sterile fashion  Needle gauge: 21 G.  Approach: lateral  Medications administered: 5 mL lidocaine 1 %; 40 mg triamcinolone acetonide 40 MG/ML  Patient tolerance: patient tolerated the procedure well with no immediate complications      Large Joint Arthrocentesis: L knee  Date/Time: 5/30/2025 9:37 AM  Consent given by: patient  Site marked: site marked  Timeout: Immediately prior to procedure a time out was called to verify the correct patient, procedure, equipment, support staff and site/side marked as required   Supporting Documentation  Indications: pain   Procedure Details  Location: knee - L knee  Preparation: Patient was prepped and draped in the usual sterile fashion  Needle gauge: 21 G.  Approach: lateral  Medications administered: 5 mL lidocaine 1 %; 40 mg triamcinolone acetonide 40 MG/ML  Patient tolerance: patient tolerated the procedure well with no immediate complications       This injection documentation was Scribed for MARGARITA Willis by Macie Benavides.  05/30/25   09:37 EDT      Imaging Results (Most Recent)       None             Results           Assessment and Plan   Diagnoses and all orders for this visit:    1. Bilateral primary osteoarthritis of knee (Primary)    Other orders  -     Large Joint Arthrocentesis: R knee  -     Large Joint Arthrocentesis: L knee         Osmani Bucio presents today to [facility] for the follow up of their bilateral knees.  Patient has bilateral knee pain and osteoarthritis that he has been trying to manage conservatively with intermittent injections.  He returns to clinic today eligible  to receive bilateral knee Zilretta injections and elects to proceed.    Patient was informed of possible adverse effects including but not limited to bleeding, damage to nerve, tendon or artery, increased blood sugar and increased blood pressure. Discussed possibility of a reaction from the injection.  Discussed the possibility that the injection may not completely improve or remove the pain.  Discussed the risk of infection.  Discussed the possibility of worsening pain after the injection.  Informed consent obtained.  Time out was performed. Patient was placed with knee in flexion at 90 degrees. Site marked inferior and lateral to patella.  Chlorhexidine was swabbed at injection site per typical technique. Needle injected into bursa, aspiration was performed and then bilateral knee steroid slowly injected into joint space, fluid was free flowing. Needle was removed, band-aid placed to injection site.  Patient tolerated injection well with no complications.     Follow up as needed.  Eligible for bilateral knee steroid/Zilretta injections in 3 months if needed.  Will need appropriate authorization approval if he would like Zilretta.            Follow Up   Return in about 3 months (around 8/30/2025), or if symptoms worsen or fail to improve.  There are no Patient Instructions on file for this visit.    Patient was given instructions and counseling regarding his condition or for health maintenance advice. Please see specific information pulled into the AVS if appropriate.     Patient or patient representative verbalized consent for the use of Ambient Listening during the visit with  MARGARITA Willis for chart documentation. 6/2/2025  07:37 EDT    Dictated Utilizing Dragon Dictation. Please note that portions of this note were completed with a voice recognition program. Part of this note may be an electronic transcription/translation of spoken language to printed text using the Dragon Dictation System.

## 2025-06-02 RX ORDER — TRIAMCINOLONE ACETONIDE 40 MG/ML
40 INJECTION, SUSPENSION INTRA-ARTICULAR; INTRAMUSCULAR
Status: COMPLETED | OUTPATIENT
Start: 2025-05-30 | End: 2025-05-30

## 2025-06-02 RX ORDER — LIDOCAINE HYDROCHLORIDE 10 MG/ML
5 INJECTION, SOLUTION INFILTRATION; PERINEURAL
Status: COMPLETED | OUTPATIENT
Start: 2025-05-30 | End: 2025-05-30

## 2025-06-02 NOTE — TELEPHONE ENCOUNTER
Upcoming Appts  With Family Medicine (MARGARITA Aponte)  06/17/2025 at 3:00 PM  Last Office Visit - This Dept  1/30/2025 Afua Varner APRN    Uds 5/7/24

## 2025-06-03 RX ORDER — TRAMADOL HYDROCHLORIDE 50 MG/1
50 TABLET ORAL EVERY 8 HOURS PRN
Qty: 30 TABLET | Refills: 0 | Status: SHIPPED | OUTPATIENT
Start: 2025-06-03

## 2025-06-04 ENCOUNTER — TELEPHONE (OUTPATIENT)
Dept: ORTHOPEDIC SURGERY | Facility: CLINIC | Age: 65
End: 2025-06-04
Payer: COMMERCIAL

## 2025-06-04 NOTE — TELEPHONE ENCOUNTER
"DENIED    CALLED MRS CABEZAS REGARDING ANDERRICK DENIAL, SAID \"OK\" (6-4-25).    LAST ARTHUR KNEE STEROID INJ:  5-30    PASSPORT=DENIED  "

## 2025-06-04 NOTE — TELEPHONE ENCOUNTER
----- Message from Eve BARNEY sent at 6/2/2025  8:51 AM EDT -----  Passport denied Zilretta injection as they will now only pay for Zilretta once a lifetime in each knee.  Okay to schedule with provider to discuss if any other options are available to him if he chooses to do so.

## 2025-06-05 ENCOUNTER — OFFICE VISIT (OUTPATIENT)
Dept: FAMILY MEDICINE CLINIC | Facility: CLINIC | Age: 65
End: 2025-06-05
Payer: COMMERCIAL

## 2025-06-05 VITALS
DIASTOLIC BLOOD PRESSURE: 86 MMHG | TEMPERATURE: 97.4 F | HEIGHT: 73 IN | BODY MASS INDEX: 29.36 KG/M2 | WEIGHT: 221.5 LBS | OXYGEN SATURATION: 97 % | SYSTOLIC BLOOD PRESSURE: 132 MMHG | HEART RATE: 80 BPM

## 2025-06-05 DIAGNOSIS — G62.9 POLYNEUROPATHY: ICD-10-CM

## 2025-06-05 DIAGNOSIS — E11.65 UNCONTROLLED TYPE 2 DIABETES MELLITUS WITH HYPERGLYCEMIA: Primary | ICD-10-CM

## 2025-06-05 DIAGNOSIS — Z79.899 MEDICATION MANAGEMENT: ICD-10-CM

## 2025-06-05 DIAGNOSIS — M51.369 DEGENERATION OF INTERVERTEBRAL DISC OF LUMBAR REGION WITHOUT DISCOGENIC BACK PAIN OR LOWER EXTREMITY PAIN: ICD-10-CM

## 2025-06-05 DIAGNOSIS — J30.89 NON-SEASONAL ALLERGIC RHINITIS, UNSPECIFIED TRIGGER: ICD-10-CM

## 2025-06-05 DIAGNOSIS — E78.2 MIXED HYPERLIPIDEMIA: ICD-10-CM

## 2025-06-05 PROCEDURE — 99214 OFFICE O/P EST MOD 30 MIN: CPT

## 2025-06-05 PROCEDURE — 1160F RVW MEDS BY RX/DR IN RCRD: CPT

## 2025-06-05 PROCEDURE — 3046F HEMOGLOBIN A1C LEVEL >9.0%: CPT

## 2025-06-05 PROCEDURE — 1125F AMNT PAIN NOTED PAIN PRSNT: CPT

## 2025-06-05 PROCEDURE — 1159F MED LIST DOCD IN RCRD: CPT

## 2025-06-05 PROCEDURE — 80305 DRUG TEST PRSMV DIR OPT OBS: CPT

## 2025-06-05 RX ORDER — PROCHLORPERAZINE 25 MG/1
1 SUPPOSITORY RECTAL ONCE
Qty: 1 EACH | Refills: 0 | Status: SHIPPED | OUTPATIENT
Start: 2025-06-05 | End: 2025-06-05

## 2025-06-05 RX ORDER — PROCHLORPERAZINE 25 MG/1
SUPPOSITORY RECTAL
Qty: 9 EACH | Refills: 1 | Status: SHIPPED | OUTPATIENT
Start: 2025-06-05

## 2025-06-05 RX ORDER — LIDOCAINE 50 MG/G
1 PATCH TOPICAL EVERY 24 HOURS
Qty: 30 EACH | Refills: 2 | Status: SHIPPED | OUTPATIENT
Start: 2025-06-05

## 2025-06-05 RX ORDER — LEVOCETIRIZINE DIHYDROCHLORIDE 5 MG/1
5 TABLET, FILM COATED ORAL EVERY EVENING
Qty: 90 TABLET | Refills: 1 | Status: SHIPPED | OUTPATIENT
Start: 2025-06-05

## 2025-06-05 NOTE — ASSESSMENT & PLAN NOTE
Slightly better but still not close to within range.  We will continue on Lantus at 34 units daily, glipizide 5 mg twice a day, and we will discontinue his metformin.  Will start him on Ozempic 0.25 mg weekly and will increase dosage in 1 month to 0.5 and ultimately get him to 1 mg.  Will also have patient sent in a Dexcom sensor to better track his blood sugar and monitor more appropriately.  Patient will follow-up in 1 month and we will repeat blood work prior to that visit.  Discussed with patient extensively about other lifestyle changes and diet adjustments.

## 2025-06-05 NOTE — PROGRESS NOTES
Osmani Bucio presents to [facility] with complaints of persistent pain, cough/congestion at night, discussed diabetes.      History of Present Illness  This is a 64-year-old male who presents to the clinic with complaints of persistent pain, cough/congestion at night, and to discuss diabetes.    Diabetes mellitus type 2: Patient states that he has been very [employer] and has been taking his medications as prescribed.  Patient states that he does remain on Lantus 34 units daily, glipizide 5 mg twice a day, metformin 500 mg twice a day, and does feel like his blood sugar is doing better.  Patient did have blood work done a few months ago which did show a slight improvement in his A1c but is very willing to do anything to try to get this down further.  Patient states that he is even cut out a lot of his sodas that he had been drinking before him.  He knows that this is contributing to his worsening pain.  He does have severe GI upset from the metformin and is wanting to know about different options to consider trying.    Pain: Patient states that he hurts all over, states that he is in a lot of pain at night, states that he does have neuropathy pretty significantly and knows that a lot of this is related to his elevated blood sugar but also his chronic arthritis.  States that he really is aggressive in getting his diabetes under control and wanting to because he does know that he ultimately needs bilateral knee replacements.  He does remain on his tramadol and his gabapentin, but only takes his gabapentin twice a day.    Patient also states that he does have this issue with a tickle/scratchiness in his throat especially when he goes to lay down at night.  Patient states that he feels as though he needs to cough for several minutes due to the irritation but then it does go away.  He did have an upper scope performed recently which they did note some phlegm in the back of his throat  "area and was told he may need allergy medication to treat this.    The following portions of the patient's history were personally reviewed and updated as appropriate: allergies, current medications, past medical history, past surgical history, past family history, and past social history.       Objective   Vital Signs:   /86 (BP Location: Left arm, Patient Position: Sitting, Cuff Size: Adult)   Pulse 80   Temp 97.4 °F (36.3 °C)   Ht 185.4 cm (72.99\")   Wt 100 kg (221 lb 8 oz)   SpO2 97%   BMI 29.23 kg/m²     Body mass index is 29.23 kg/m².    All labs, imaging, test results, and specialty provider notes reviewed with patient.     Physical Exam  Vitals reviewed.   Constitutional:       Appearance: Normal appearance.   Cardiovascular:      Rate and Rhythm: Normal rate and regular rhythm.      Pulses: Normal pulses.      Heart sounds: Normal heart sounds.   Pulmonary:      Effort: Pulmonary effort is normal.      Breath sounds: Normal breath sounds.   Neurological:      General: No focal deficit present.      Mental Status: He is alert and oriented to person, place, and time.                               Assessment and Plan:  Diagnoses and all orders for this visit:    1. Uncontrolled type 2 diabetes mellitus with hyperglycemia (Primary)  Assessment & Plan:  Slightly better but still not close to within range.  We will continue on Lantus at 34 units daily, glipizide 5 mg twice a day, and we will discontinue his metformin.  Will start him on Ozempic 0.25 mg weekly and will increase dosage in 1 month to 0.5 and ultimately get him to 1 mg.  Will also have patient sent in a Dexcom sensor to better track his blood sugar and monitor more appropriately.  Patient will follow-up in 1 month and we will repeat blood work prior to that visit.  Discussed with patient extensively about other lifestyle changes and diet adjustments.      Orders:  -     Semaglutide,0.25 or 0.5MG/DOS, (OZEMPIC) 2 MG/1.5ML solution " pen-injector; Inject 0.25 mg under the skin into the appropriate area as directed 1 (One) Time Per Week.  Dispense: 1.5 mL; Refill: 2  -     Continuous Glucose  (Dexcom G6 ) device; Use 1 Device 1 time for 1 dose.  Dispense: 1 each; Refill: 0  -     Continuous Glucose Transmitter (Dexcom G6 Transmitter) misc; Use 1 Device 1 time for 1 dose.  Dispense: 1 each; Refill: 0  -     Continuous Glucose Sensor (Dexcom G6 Sensor); Use Every 10 (Ten) Days.  Dispense: 9 each; Refill: 1  -     CBC Auto Differential; Future  -     Comprehensive Metabolic Panel; Future  -     Hemoglobin A1c; Future  -     TSH Rfx On Abnormal To Free T4; Future  -     Microalbumin / Creatinine Urine Ratio - Urine, Clean Catch; Future  -     Urinalysis With Culture If Indicated -; Future  -     Insulin Glargine (LANTUS SOLOSTAR) 100 UNIT/ML injection pen; Inject 34 Units under the skin into the appropriate area as directed Daily. Increase by 3 units every 3 days until fasting blood sugar less than 250 or reaches 40 units  Dispense: 45 mL; Refill: 1    2. Medication management  -     POC Medline 12 Panel Urine Drug Screen    3. Polyneuropathy    4. Degeneration of intervertebral disc of lumbar region without discogenic back pain or lower extremity pain  Assessment & Plan:  Send in lidocaine patches to help, continue on tramadol as prescribed and will continue on gabapentin as prescribed.  Do recommend patient take this 3 times a day as prescribed.  Ultimately helping treat diabetes will help reduce inflammation in the body and thus hopefully help with pain control as well.    Orders:  -     lidocaine (LIDODERM) 5 %; Place 1 patch on the skin as directed by provider Daily. Remove & Discard patch within 12 hours or as directed by MD  Dispense: 30 each; Refill: 2    5. Non-seasonal allergic rhinitis, unspecified trigger  Comments:  Start on Xyzal nightly.  Orders:  -     levocetirizine (XYZAL) 5 MG tablet; Take 1 tablet by mouth Every  Evening.  Dispense: 90 tablet; Refill: 1    6. Mixed hyperlipidemia  -     Lipid Panel; Future          Follow Up:  Return in about 4 weeks (around 7/3/2025).    Patient was given instructions and counseling regarding his condition or for health maintenance advice. Please see specific information pulled into the AVS if appropriate.

## 2025-06-05 NOTE — ASSESSMENT & PLAN NOTE
Send in lidocaine patches to help, continue on tramadol as prescribed and will continue on gabapentin as prescribed.  Do recommend patient take this 3 times a day as prescribed.  Ultimately helping treat diabetes will help reduce inflammation in the body and thus hopefully help with pain control as well.

## 2025-06-18 DIAGNOSIS — M19.90 ARTHRITIS: ICD-10-CM

## 2025-06-18 DIAGNOSIS — M51.369 DDD (DEGENERATIVE DISC DISEASE), LUMBAR: ICD-10-CM

## 2025-06-18 NOTE — TELEPHONE ENCOUNTER
Caller: ALLEN CABEZAS    Relationship: Emergency Contact    Best call back number: [phone]    Requested Prescriptions:   Requested Prescriptions     Pending Prescriptions Disp Refills    traMADol (ULTRAM) 50 MG tablet 30 tablet 0     Sig: Take 1 tablet by mouth Every 8 (Eight) Hours As Needed for Moderate Pain.        Pharmacy where request should be sent: [facility] [address] [place] [address] [phone] [facility] [phone]      Last office visit with prescribing clinician: 6/5/2025   Last telemedicine visit with prescribing clinician: Visit date not found   Next office visit with prescribing clinician: 7/15/2025         Does the patient have less than a 3 day supply:  [x] Yes  [] No      Iker Heller Rep   06/18/25 15:26 EDT

## 2025-06-19 RX ORDER — TRAMADOL HYDROCHLORIDE 50 MG/1
50 TABLET ORAL EVERY 8 HOURS PRN
Qty: 90 TABLET | Refills: 2 | Status: SHIPPED | OUTPATIENT
Start: 2025-06-19

## 2025-06-19 NOTE — TELEPHONE ENCOUNTER
Kole Rodriguez is calling to request a refill on the following medication(s):    Medication Request:  Requested Prescriptions     Pending Prescriptions Disp Refills    sildenafil (VIAGRA) 100 MG tablet 30 tablet 1     Sig: Take 1 tablet by mouth daily as needed for Erectile Dysfunction       Last Visit Date (If Applicable):  9/23/2024    Next Visit Date:    Visit date not found                 UDS 6-5-25  CSA 6-5-25   coarse

## 2025-07-07 DIAGNOSIS — G62.9 POLYNEUROPATHY: ICD-10-CM

## 2025-07-07 NOTE — TELEPHONE ENCOUNTER
Caller: ALLEN CABEZAS    Relationship: Emergency Contact    Best call back number:     [phone]       Requested Prescriptions:   Requested Prescriptions     Pending Prescriptions Disp Refills    gabapentin (NEURONTIN) 600 MG tablet 90 tablet 2     Sig: Take 1 tablet by mouth 3 (Three) Times a Day.        Pharmacy where request should be sent: [address] [phone] [facility] [phone]      Last office visit with prescribing clinician: 6/5/2025   Last telemedicine visit with prescribing clinician: Visit date not found   Next office visit with prescribing clinician: 7/15/2025     Additional details provided by patient:     Does the patient have less than a 3 day supply:  [] Yes  [] No    Would you like a call back once the refill request has been completed: [] Yes [] No    If the office needs to give you a call back, can they leave a voicemail: [] Yes [] No    Iker Calix Rep   07/07/25 15:03 EDT

## 2025-07-08 RX ORDER — GABAPENTIN 600 MG/1
600 TABLET ORAL 3 TIMES DAILY
Qty: 90 TABLET | Refills: 2 | Status: SHIPPED | OUTPATIENT
Start: 2025-07-08

## 2025-07-09 ENCOUNTER — LAB (OUTPATIENT)
Dept: LAB | Facility: HOSPITAL | Age: 65
End: 2025-07-09
Payer: COMMERCIAL

## 2025-07-09 DIAGNOSIS — E78.2 MIXED HYPERLIPIDEMIA: ICD-10-CM

## 2025-07-09 DIAGNOSIS — E11.65 UNCONTROLLED TYPE 2 DIABETES MELLITUS WITH HYPERGLYCEMIA: ICD-10-CM

## 2025-07-09 LAB
ALBUMIN SERPL-MCNC: 4.4 G/DL (ref 3.5–5.2)
ALBUMIN/GLOB SERPL: 1.4 G/DL
ALP SERPL-CCNC: 59 U/L (ref 39–117)
ALT SERPL W P-5'-P-CCNC: 19 U/L (ref 1–41)
ANION GAP SERPL CALCULATED.3IONS-SCNC: 10.5 MMOL/L (ref 5–15)
AST SERPL-CCNC: 20 U/L (ref 1–40)
BASOPHILS # BLD AUTO: 0.09 10*3/MM3 (ref 0–0.2)
BASOPHILS NFR BLD AUTO: 0.9 % (ref 0–1.5)
BILIRUB SERPL-MCNC: 0.7 MG/DL (ref 0–1.2)
BUN SERPL-MCNC: 19 MG/DL (ref 8–23)
BUN/CREAT SERPL: 15 (ref 7–25)
CALCIUM SPEC-SCNC: 9.8 MG/DL (ref 8.6–10.5)
CHLORIDE SERPL-SCNC: 106 MMOL/L (ref 98–107)
CHOLEST SERPL-MCNC: 129 MG/DL (ref 0–200)
CO2 SERPL-SCNC: 24.5 MMOL/L (ref 22–29)
CREAT SERPL-MCNC: 1.27 MG/DL (ref 0.76–1.27)
DEPRECATED RDW RBC AUTO: 43.4 FL (ref 37–54)
EGFRCR SERPLBLD CKD-EPI 2021: 63.5 ML/MIN/1.73
EOSINOPHIL # BLD AUTO: 0.23 10*3/MM3 (ref 0–0.4)
EOSINOPHIL NFR BLD AUTO: 2.3 % (ref 0.3–6.2)
ERYTHROCYTE [DISTWIDTH] IN BLOOD BY AUTOMATED COUNT: 13.2 % (ref 12.3–15.4)
GLOBULIN UR ELPH-MCNC: 3.2 GM/DL
GLUCOSE SERPL-MCNC: 183 MG/DL (ref 65–99)
HBA1C MFR BLD: 8.8 % (ref 4.8–5.6)
HCT VFR BLD AUTO: 51.5 % (ref 37.5–51)
HDLC SERPL-MCNC: 29 MG/DL (ref 40–60)
HGB BLD-MCNC: 16.7 G/DL (ref 13–17.7)
IMM GRANULOCYTES # BLD AUTO: 0.04 10*3/MM3 (ref 0–0.05)
IMM GRANULOCYTES NFR BLD AUTO: 0.4 % (ref 0–0.5)
LDLC SERPL CALC-MCNC: 73 MG/DL (ref 0–100)
LDLC/HDLC SERPL: 2.37 {RATIO}
LYMPHOCYTES # BLD AUTO: 3.44 10*3/MM3 (ref 0.7–3.1)
LYMPHOCYTES NFR BLD AUTO: 33.9 % (ref 19.6–45.3)
MCH RBC QN AUTO: 29.3 PG (ref 26.6–33)
MCHC RBC AUTO-ENTMCNC: 32.4 G/DL (ref 31.5–35.7)
MCV RBC AUTO: 90.4 FL (ref 79–97)
MONOCYTES # BLD AUTO: 0.88 10*3/MM3 (ref 0.1–0.9)
MONOCYTES NFR BLD AUTO: 8.7 % (ref 5–12)
NEUTROPHILS NFR BLD AUTO: 5.47 10*3/MM3 (ref 1.7–7)
NEUTROPHILS NFR BLD AUTO: 53.8 % (ref 42.7–76)
NRBC BLD AUTO-RTO: 0 /100 WBC (ref 0–0.2)
PLATELET # BLD AUTO: 303 10*3/MM3 (ref 140–450)
PMV BLD AUTO: 10.6 FL (ref 6–12)
POTASSIUM SERPL-SCNC: 5.4 MMOL/L (ref 3.5–5.2)
PROT SERPL-MCNC: 7.6 G/DL (ref 6–8.5)
RBC # BLD AUTO: 5.7 10*6/MM3 (ref 4.14–5.8)
SODIUM SERPL-SCNC: 141 MMOL/L (ref 136–145)
TRIGL SERPL-MCNC: 157 MG/DL (ref 0–150)
TSH SERPL DL<=0.05 MIU/L-ACNC: 2.84 UIU/ML (ref 0.27–4.2)
VLDLC SERPL-MCNC: 27 MG/DL (ref 5–40)
WBC NRBC COR # BLD AUTO: 10.15 10*3/MM3 (ref 3.4–10.8)

## 2025-07-09 PROCEDURE — 85025 COMPLETE CBC W/AUTO DIFF WBC: CPT

## 2025-07-09 PROCEDURE — 80053 COMPREHEN METABOLIC PANEL: CPT

## 2025-07-09 PROCEDURE — 84443 ASSAY THYROID STIM HORMONE: CPT

## 2025-07-09 PROCEDURE — 80061 LIPID PANEL: CPT

## 2025-07-09 PROCEDURE — 83036 HEMOGLOBIN GLYCOSYLATED A1C: CPT

## 2025-07-09 PROCEDURE — 36415 COLL VENOUS BLD VENIPUNCTURE: CPT

## 2025-07-15 ENCOUNTER — OFFICE VISIT (OUTPATIENT)
Dept: FAMILY MEDICINE CLINIC | Facility: CLINIC | Age: 65
End: 2025-07-15
Payer: COMMERCIAL

## 2025-07-15 VITALS
OXYGEN SATURATION: 94 % | HEART RATE: 85 BPM | TEMPERATURE: 98 F | BODY MASS INDEX: 29.29 KG/M2 | SYSTOLIC BLOOD PRESSURE: 116 MMHG | HEIGHT: 73 IN | DIASTOLIC BLOOD PRESSURE: 70 MMHG | WEIGHT: 221 LBS

## 2025-07-15 DIAGNOSIS — G62.9 POLYNEUROPATHY: ICD-10-CM

## 2025-07-15 DIAGNOSIS — Z00.00 ANNUAL PHYSICAL EXAM: ICD-10-CM

## 2025-07-15 DIAGNOSIS — M19.90 ARTHRITIS: ICD-10-CM

## 2025-07-15 DIAGNOSIS — K58.1 IRRITABLE BOWEL SYNDROME WITH CONSTIPATION: ICD-10-CM

## 2025-07-15 DIAGNOSIS — Z12.5 PROSTATE CANCER SCREENING: ICD-10-CM

## 2025-07-15 DIAGNOSIS — E78.2 MIXED HYPERLIPIDEMIA: ICD-10-CM

## 2025-07-15 DIAGNOSIS — M51.362 DEGENERATION OF INTERVERTEBRAL DISC OF LUMBAR REGION WITH DISCOGENIC BACK PAIN AND LOWER EXTREMITY PAIN: ICD-10-CM

## 2025-07-15 DIAGNOSIS — E11.65 UNCONTROLLED TYPE 2 DIABETES MELLITUS WITH HYPERGLYCEMIA: Primary | ICD-10-CM

## 2025-07-15 PROCEDURE — 3052F HG A1C>EQUAL 8.0%<EQUAL 9.0%: CPT

## 2025-07-15 PROCEDURE — 1125F AMNT PAIN NOTED PAIN PRSNT: CPT

## 2025-07-15 PROCEDURE — 99396 PREV VISIT EST AGE 40-64: CPT

## 2025-07-15 NOTE — ASSESSMENT & PLAN NOTE
Diabetes is improving with current medication, could not tolerate Ozempic so we will discontinue that.  We will increase Lantus to 40 units, get patient access to a Dexcom before we adjust any further.  Will continue on glipizide and metformin as well.  Will repeat A1c in 3 months.  Lifestyle modifications discussed.

## 2025-07-15 NOTE — PROGRESS NOTES
Osmani ECTOR Mojicaff presents to [facility] who presents to clinic for 3-month follow-up.      History of Present Illness  This is a 65-year-old male who presents to clinic for 3-month follow-up.    Diabetes mellitus type 2: A1c has drastically improved to 8.8%.  Patient states that he is tolerating and taking the Lantus on a daily basis, he is now currently up to 37 units, and doing well.  Does not check his blood sugar at home because he states that he does not like to poke his finger but would be willing to do the Dexcom although he has not heard anything from getting this although it was sent in 3 months ago.  Patient states that he was on the Ozempic, but had to stop it because of severe GI upset that he was constantly dry heaving and vomiting and states that he just was not tolerable for him.  He has since then started back on his metformin, and remains on his glipizide as well.  Patient states that he is happy with the progress and states that he is even noticed that some of his neuropathy pain is improving.  Is hopeful that this will continue to get better and states that he is really trying hard to get his health in better shape.    Patient is also having some concerns about constipation.  Patient states that he has had issues with having a lot of gas, bloating, cramping, and states that he will have difficulty in time with having a bowel movement.  States that sometimes he has the urge to go, but it will be a lot of gas before he actually passes a bowel movement and then it is very small amounts and does not feel like he is fully emptying.  Does feel like some of it is related to stress as well.  Denies any actual abdominal pain, no fever/chill/bodyaches, no recent illness, no worsening nausea.    Refuses vaccine/immunizations, discussed about eye exam, otherwise up-to-date on other preventative screenings.    The following portions of the patient's history were personally  "reviewed and updated as appropriate: allergies, current medications, past medical history, past surgical history, past family history, and past social history.       Objective   Vital Signs:   /70 (BP Location: Left arm, Patient Position: Sitting, Cuff Size: Adult)   Pulse 85   Temp 98 °F (36.7 °C)   Ht 185.4 cm (72.99\")   Wt 100 kg (221 lb)   SpO2 94%   BMI 29.16 kg/m²     Body mass index is 29.16 kg/m².    All labs, imaging, test results, and specialty provider notes reviewed with patient.     Physical Exam  Vitals reviewed.   Constitutional:       Appearance: Normal appearance.   Cardiovascular:      Rate and Rhythm: Normal rate and regular rhythm.      Pulses: Normal pulses.      Heart sounds: Normal heart sounds.   Pulmonary:      Effort: Pulmonary effort is normal.      Breath sounds: Normal breath sounds.   Feet:      Right foot:      Protective Sensation: 4 sites tested.  4 sites sensed.      Skin integrity: Skin integrity normal.      Left foot:      Protective Sensation: 4 sites tested.  4 sites sensed.      Skin integrity: Skin integrity normal.   Neurological:      General: No focal deficit present.      Mental Status: He is alert and oriented to person, place, and time.                               Assessment and Plan:  Diagnoses and all orders for this visit:    1. Uncontrolled type 2 diabetes mellitus with hyperglycemia (Primary)  Assessment & Plan:  Diabetes is improving with current medication, could not tolerate Ozempic so we will discontinue that.  We will increase Lantus to 40 units, get patient access to a Dexcom before we adjust any further.  Will continue on glipizide and metformin as well.  Will repeat A1c in 3 months.  Lifestyle modifications discussed.      Orders:  -     CBC Auto Differential; Future  -     Comprehensive Metabolic Panel; Future  -     Hemoglobin A1c; Future  -     Urinalysis With Culture If Indicated -; Future  -     Microalbumin / Creatinine Urine Ratio - " Urine, Clean Catch; Future    2. Arthritis    3. Degeneration of intervertebral disc of lumbar region with discogenic back pain and lower extremity pain    4. Polyneuropathy  Comments:  Improving, continue with gabapentin at current dosage.    5. Irritable bowel syndrome with constipation  Comments:  Will treat with Linzess, discussed getting diabetes under control should help as well, discussed his medication use risk and side effects.  Diet adjustments.  Orders:  -     linaclotide (LINZESS) 72 MCG capsule capsule; Take 1 capsule by mouth Every Morning Before Breakfast.  Dispense: 30 capsule; Refill: 2    6. Mixed hyperlipidemia  -     Lipid Panel; Future    7. Annual physical exam  -     CBC Auto Differential; Future  -     Comprehensive Metabolic Panel; Future  -     TSH Rfx On Abnormal To Free T4; Future    8. Prostate cancer screening  -     PSA SCREENING; Future    Anticipatory Guidelines discussed with patient.    Discussed getting adequate exercise, reduced TV/electronic time, car safety including seat belt use, sexual activity (if applicable), and smoking/alcohol use (if applicable).      Follow Up:  Return in about 3 months (around 10/15/2025).    Patient was given instructions and counseling regarding his condition or for health maintenance advice. Please see specific information pulled into the AVS if appropriate.

## 2025-07-17 ENCOUNTER — TELEPHONE (OUTPATIENT)
Dept: FAMILY MEDICINE CLINIC | Facility: CLINIC | Age: 65
End: 2025-07-17
Payer: COMMERCIAL

## 2025-07-17 NOTE — TELEPHONE ENCOUNTER
Caller: Osmani Bucio    Relationship to patient: Self      Best call back number: [phone]    Provider: MARGARITA MEYER      Medication PA needed: KIANA     Reason for call/Prior Auth: N/A     PATIENTS WIFE STATES THE PHARMACY ALSO DID NOT HAVE A PRESCRIPTION FOR THE BLOOD PRESSURE CUFF THAT PCP WANTED PATIENT TO GET. CALLER STATES SHE WAS UNDER THE IMPRESSION THIS WOULD BE A PRESCRIPTION AND NOT SOMETHING THEY WOULD HAVE TO PAY OUT OF POCKET FOR.

## 2025-07-18 NOTE — TELEPHONE ENCOUNTER
PA SENT for linzess      Patient will need to call insurance to see what DME company we would send the dexcom

## 2025-07-18 NOTE — TELEPHONE ENCOUNTER
"Relay     \"LVM FOR PATIENT     Jennie was approved by insurance and they can it up now from the pharmacy     Also needing to know if they want me to send the dexcom to a speciality pharmacy for the dexcom - it is mail order meaning they would ship the dexcom to their house - if so I will fax everything to them today and they would receive a call from [employer] today or next week to set up the shipment. \"                  "

## 2025-07-28 ENCOUNTER — CLINICAL SUPPORT (OUTPATIENT)
Dept: FAMILY MEDICINE CLINIC | Facility: CLINIC | Age: 65
End: 2025-07-28
Payer: COMMERCIAL

## 2025-07-28 DIAGNOSIS — E11.65 UNCONTROLLED TYPE 2 DIABETES MELLITUS WITH HYPERGLYCEMIA: Primary | ICD-10-CM

## 2025-07-28 LAB — GLUCOSE BLDC GLUCOMTR-MCNC: 286 MG/DL (ref 70–130)

## 2025-07-28 PROCEDURE — 82948 REAGENT STRIP/BLOOD GLUCOSE: CPT

## (undated) DEVICE — DEFENDO AIR WATER SUCTION AND BIOPSY VALVE KIT FOR  OLYMPUS: Brand: DEFENDO AIR/WATER/SUCTION AND BIOPSY VALVE

## (undated) DEVICE — CONN JET HYDRA H20 AUXILIARY DISP

## (undated) DEVICE — SINGLE-USE BIOPSY FORCEPS: Brand: RADIAL JAW 4

## (undated) DEVICE — LINER SURG CANSTR SXN S/RIGD 1500CC

## (undated) DEVICE — BLCK/BITE BLOX WO/DENTL/RIM W/STRAP 54F

## (undated) DEVICE — THE STERILE LIGHT HANDLE COVER IS USED WITH STERIS SURGICAL LIGHTING AND VISUALIZATION SYSTEMS.

## (undated) DEVICE — Device

## (undated) DEVICE — SOLIDIFIER LIQLOC PLS 1500CC BT

## (undated) DEVICE — SOL IRRG H2O PL/BG 1000ML STRL